# Patient Record
Sex: FEMALE | Race: WHITE | Employment: UNEMPLOYED | URBAN - METROPOLITAN AREA
[De-identification: names, ages, dates, MRNs, and addresses within clinical notes are randomized per-mention and may not be internally consistent; named-entity substitution may affect disease eponyms.]

---

## 2019-07-31 ENCOUNTER — HOSPITAL ENCOUNTER (OUTPATIENT)
Dept: MAMMOGRAPHY | Age: 49
Discharge: HOME | End: 2019-07-31
Attending: INTERNAL MEDICINE
Payer: COMMERCIAL

## 2019-07-31 DIAGNOSIS — Z12.31 ENCOUNTER FOR SCREENING MAMMOGRAM FOR MALIGNANT NEOPLASM OF BREAST: ICD-10-CM

## 2019-07-31 PROCEDURE — 77063 BREAST TOMOSYNTHESIS BI: CPT

## 2019-08-07 ENCOUNTER — APPOINTMENT (RX ONLY)
Dept: URBAN - METROPOLITAN AREA CLINIC 24 | Facility: CLINIC | Age: 49
Setting detail: DERMATOLOGY
End: 2019-08-07

## 2019-08-07 DIAGNOSIS — L57.8 OTHER SKIN CHANGES DUE TO CHRONIC EXPOSURE TO NONIONIZING RADIATION: ICD-10-CM

## 2019-08-07 DIAGNOSIS — D485 NEOPLASM OF UNCERTAIN BEHAVIOR OF SKIN: ICD-10-CM

## 2019-08-07 DIAGNOSIS — Z71.89 OTHER SPECIFIED COUNSELING: ICD-10-CM

## 2019-08-07 DIAGNOSIS — L81.4 OTHER MELANIN HYPERPIGMENTATION: ICD-10-CM

## 2019-08-07 DIAGNOSIS — D22 MELANOCYTIC NEVI: ICD-10-CM

## 2019-08-07 DIAGNOSIS — L82.1 OTHER SEBORRHEIC KERATOSIS: ICD-10-CM

## 2019-08-07 PROBLEM — D48.5 NEOPLASM OF UNCERTAIN BEHAVIOR OF SKIN: Status: ACTIVE | Noted: 2019-08-07

## 2019-08-07 PROBLEM — D22.0 MELANOCYTIC NEVI OF LIP: Status: ACTIVE | Noted: 2019-08-07

## 2019-08-07 PROBLEM — D22.62 MELANOCYTIC NEVI OF LEFT UPPER LIMB, INCLUDING SHOULDER: Status: ACTIVE | Noted: 2019-08-07

## 2019-08-07 PROBLEM — D22.72 MELANOCYTIC NEVI OF LEFT LOWER LIMB, INCLUDING HIP: Status: ACTIVE | Noted: 2019-08-07

## 2019-08-07 PROBLEM — D23.71 OTHER BENIGN NEOPLASM OF SKIN OF RIGHT LOWER LIMB, INCLUDING HIP: Status: ACTIVE | Noted: 2019-08-07

## 2019-08-07 PROCEDURE — ? COUNSELING

## 2019-08-07 PROCEDURE — ? BIOPSY BY SHAVE METHOD

## 2019-08-07 PROCEDURE — 11102 TANGNTL BX SKIN SINGLE LES: CPT

## 2019-08-07 PROCEDURE — 99203 OFFICE O/P NEW LOW 30 MIN: CPT | Mod: 25

## 2019-08-07 ASSESSMENT — LOCATION DETAILED DESCRIPTION DERM
LOCATION DETAILED: RIGHT UPPER CUTANEOUS LIP
LOCATION DETAILED: LEFT POPLITEAL SKIN
LOCATION DETAILED: RIGHT LATERAL DORSAL FOOT
LOCATION DETAILED: RIGHT DISTAL DORSAL FOREARM
LOCATION DETAILED: UPPER STERNUM
LOCATION DETAILED: LEFT PROXIMAL POSTERIOR UPPER ARM
LOCATION DETAILED: LEFT INFERIOR LATERAL FOREHEAD

## 2019-08-07 ASSESSMENT — LOCATION SIMPLE DESCRIPTION DERM
LOCATION SIMPLE: RIGHT FOREARM
LOCATION SIMPLE: LEFT POSTERIOR UPPER ARM
LOCATION SIMPLE: LEFT POPLITEAL SKIN
LOCATION SIMPLE: RIGHT LIP
LOCATION SIMPLE: CHEST
LOCATION SIMPLE: RIGHT FOOT
LOCATION SIMPLE: LEFT FOREHEAD

## 2019-08-07 ASSESSMENT — LOCATION ZONE DERM
LOCATION ZONE: LEG
LOCATION ZONE: LIP
LOCATION ZONE: FACE
LOCATION ZONE: ARM
LOCATION ZONE: TRUNK
LOCATION ZONE: FEET

## 2019-08-07 NOTE — PROCEDURE: BIOPSY BY SHAVE METHOD
Render In Bullet Format When Appropriate: No
Anesthesia Type: 1% lidocaine with epinephrine and a 1:10 solution of 8.4% sodium bicarbonate
Hemostasis: Aluminum Chloride
Electrodesiccation And Curettage Text: The wound bed was treated with electrodesiccation and curettage after the biopsy was performed.
Consent: Written consent was obtained and risks were reviewed including but not limited to scarring, infection, bleeding, scabbing, incomplete removal, nerve damage and allergy to anesthesia.
Curettage Text: The wound bed was treated with curettage after the biopsy was performed.
X Size Of Lesion In Cm: 0
Depth Of Biopsy: dermis
Dressing: bandage
Render Post-Care Instructions In Note?: yes
Silver Nitrate Text: The wound bed was treated with silver nitrate after the biopsy was performed.
Wound Care: Vaseline
Electrodesiccation Text: The wound bed was treated with electrodesiccation after the biopsy was performed.
Billing Type: Third-Party Bill
Biopsy Method: Personna blade
Detail Level: Detailed
Anesthesia Volume In Cc: 0.1
Biopsy Type: H and E
Type Of Destruction Used: Curettage
Cryotherapy Text: The wound bed was treated with cryotherapy after the biopsy was performed.
Post-Care Instructions: I reviewed with the patient in detail post-care instructions. Patient is to keep the biopsy site dry overnight, and then apply vaseline twice daily until healed. Patient may apply hydrogen peroxide soaks to remove any crusting. Patient given a wound care sheet.
Notification Instructions: Patient will be notified of biopsy results. However, patient instructed to call the office if not contacted within 2 weeks.

## 2019-08-15 ENCOUNTER — HOSPITAL ENCOUNTER (OUTPATIENT)
Dept: MAMMOGRAPHY | Age: 49
Discharge: HOME | End: 2019-08-15
Attending: INTERNAL MEDICINE
Payer: COMMERCIAL

## 2019-08-15 DIAGNOSIS — R92.8 ABNORMAL SCREENING MAMMOGRAM: ICD-10-CM

## 2019-08-15 PROCEDURE — 77065 DX MAMMO INCL CAD UNI: CPT

## 2019-08-21 ENCOUNTER — HOSPITAL ENCOUNTER (OUTPATIENT)
Dept: MAMMOGRAPHY | Age: 49
Discharge: HOME | End: 2019-08-21
Attending: INTERNAL MEDICINE
Payer: COMMERCIAL

## 2019-08-21 VITALS
HEART RATE: 66 BPM | OXYGEN SATURATION: 99 % | SYSTOLIC BLOOD PRESSURE: 103 MMHG | TEMPERATURE: 97.1 F | DIASTOLIC BLOOD PRESSURE: 57 MMHG

## 2019-08-21 DIAGNOSIS — R92.8 ABNORMAL MAMMOGRAM OF RIGHT BREAST: ICD-10-CM

## 2019-08-21 PROCEDURE — 74011000250 HC RX REV CODE- 250

## 2019-08-21 PROCEDURE — 74011250636 HC RX REV CODE- 250/636

## 2019-08-21 PROCEDURE — 19081 BX BREAST 1ST LESION STRTCTC: CPT

## 2019-08-21 PROCEDURE — 88305 TISSUE EXAM BY PATHOLOGIST: CPT

## 2019-08-21 PROCEDURE — 88342 IMHCHEM/IMCYTCHM 1ST ANTB: CPT

## 2019-08-21 PROCEDURE — 77065 DX MAMMO INCL CAD UNI: CPT

## 2019-08-21 RX ORDER — LIDOCAINE HYDROCHLORIDE 10 MG/ML
5 INJECTION INFILTRATION; PERINEURAL
Status: COMPLETED | OUTPATIENT
Start: 2019-08-21 | End: 2019-08-21

## 2019-08-21 RX ORDER — LIDOCAINE HYDROCHLORIDE AND EPINEPHRINE 10; 10 MG/ML; UG/ML
5 INJECTION, SOLUTION INFILTRATION; PERINEURAL
Status: COMPLETED | OUTPATIENT
Start: 2019-08-21 | End: 2019-08-21

## 2019-08-21 RX ORDER — LIDOCAINE HYDROCHLORIDE AND EPINEPHRINE 10; 10 MG/ML; UG/ML
10 INJECTION, SOLUTION INFILTRATION; PERINEURAL
Status: COMPLETED | OUTPATIENT
Start: 2019-08-21 | End: 2019-08-21

## 2019-08-21 RX ADMIN — LIDOCAINE HYDROCHLORIDE,EPINEPHRINE BITARTRATE 40 MG: 10; .01 INJECTION, SOLUTION INFILTRATION; PERINEURAL at 12:04

## 2019-08-21 RX ADMIN — LIDOCAINE HYDROCHLORIDE 5 ML: 10 INJECTION, SOLUTION INFILTRATION; PERINEURAL at 12:04

## 2019-08-21 RX ADMIN — SODIUM CHLORIDE 250 ML: 900 INJECTION, SOLUTION INTRAVENOUS at 12:01

## 2019-08-21 RX ADMIN — LIDOCAINE HYDROCHLORIDE,EPINEPHRINE BITARTRATE 100 MG: 10; .01 INJECTION, SOLUTION INFILTRATION; PERINEURAL at 12:03

## 2019-08-22 ENCOUNTER — APPOINTMENT (RX ONLY)
Dept: URBAN - METROPOLITAN AREA CLINIC 24 | Facility: CLINIC | Age: 49
Setting detail: DERMATOLOGY
End: 2019-08-22

## 2019-08-22 PROBLEM — D04.71 CARCINOMA IN SITU OF SKIN OF RIGHT LOWER LIMB, INCLUDING HIP: Status: ACTIVE | Noted: 2019-08-22

## 2019-08-22 PROCEDURE — 17272 DSTR MAL LES S/N/H/F/G 1.1-2: CPT

## 2019-08-22 PROCEDURE — ? CURETTAGE AND DESTRUCTION

## 2019-08-22 NOTE — PROCEDURE: CURETTAGE AND DESTRUCTION
Additional Information: (Optional): The wound was cleaned, and a pressure dressing was applied.  The patient received detailed post-op instructions.
Size Of Lesion After Curettage: 1.3
Bill For Surgical Tray: no
Total Volume (Ccs): 1
Render Post-Care Instructions In Note?: yes
Number Of Curettages: 3
Detail Level: Detailed
Post-Care Instructions: I reviewed with the patient in detail post-care instructions. Patient is to keep the area dry for 48 hours, and not to engage in any swimming until the area is healed. Should the patient develop any fevers, chills, bleeding, severe pain patient will contact the office immediately.
Anesthesia Type: 1% lidocaine with epinephrine and a 1:10 solution of 8.4% sodium bicarbonate
Anesthesia Volume In Cc: 2
Consent was obtained from the patient. The risks, benefits and alternatives to therapy were discussed in detail. Specifically, the risks of infection, scarring, bleeding, prolonged wound healing, nerve injury, incomplete removal, allergy to anesthesia and recurrence were addressed. Alternatives to ED&C, such as: surgical removal and cream were also discussed.  Prior to the procedure, the treatment site was clearly identified and confirmed by the patient. All components of Universal Protocol/PAUSE Rule completed.
Size Of Lesion In Cm: 1.2
Bill As A Line Item Or As Units: Line Item
What Was Performed First?: Curettage
Cautery Type: electrodesiccation

## 2019-08-22 NOTE — PROGRESS NOTES
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 Dr. Radha Beltrán and I discussed the results as well as the next steps, the patient is going to have a bilateral breast MRI on 8-26-19 and then follow up with Flavia Mane NP about which surgeon she wants to see. The patient stated that she is friends with a surgeon at [facility] and would like to speak with him about his recommendations. The patient did receive a new breast cancer patient packet of information that includes the contact information for the navigators in case she has any further questions.

## 2019-08-26 ENCOUNTER — HOSPITAL ENCOUNTER (OUTPATIENT)
Dept: MRI IMAGING | Age: 49
Discharge: HOME | End: 2019-08-26
Payer: COMMERCIAL

## 2019-08-26 DIAGNOSIS — R92.8 ABNORMAL FINDING ON MAMMOGRAPHY: ICD-10-CM

## 2019-08-26 PROCEDURE — 74011000258 HC RX REV CODE- 258

## 2019-08-26 PROCEDURE — 74011250636 HC RX REV CODE- 250/636

## 2019-08-26 PROCEDURE — 77049 MRI BREAST C-+ W/CAD BI: CPT

## 2019-08-26 PROCEDURE — A9575 INJ GADOTERATE MEGLUMI 0.1ML: HCPCS

## 2019-08-26 RX ORDER — SODIUM CHLORIDE 0.9 % (FLUSH) 0.9 %
10 SYRINGE (ML) INJECTION
Status: COMPLETED | OUTPATIENT
Start: 2019-08-26 | End: 2019-08-26

## 2019-08-26 RX ORDER — GADOTERATE MEGLUMINE 376.9 MG/ML
15 INJECTION INTRAVENOUS
Status: COMPLETED | OUTPATIENT
Start: 2019-08-26 | End: 2019-08-26

## 2019-08-26 RX ADMIN — GADOTERATE MEGLUMINE 15 ML: 376.9 INJECTION INTRAVENOUS at 11:27

## 2019-08-26 RX ADMIN — Medication 10 ML: at 11:27

## 2019-08-26 RX ADMIN — SODIUM CHLORIDE 100 ML: 900 INJECTION, SOLUTION INTRAVENOUS at 11:27

## 2019-09-09 NOTE — H&P (VIEW-ONLY)
[facility] 
[address], [address] [place] Phone [phone]   Fax [phone] Date of visit: 2019 Primary/Requesting provider: Abdulaziz Hugo NP Chief Complaint Patient presents with  New Patient  
  right breast DCIS Name: Paula Riggins MRN: [id number] : 1970 Age: 51 y.o. Sex: female PCP: Abdulaziz Hugo NP  
 
 
CC:   
Chief Complaint Patient presents with  New Patient  
  right breast DCIS  
 
 
HPI: 
 
 Paula Riggins is a 51 y.o. female who presents for evaluation of right breast DCIS. This is a 50-year-old female recently diagnosed with biopsy-proven right breast DCIS ER CO positive. Patient has no previous history of breast cancer. She has no family history of breast cancer. The suspicious abnormality was found on screening mammogram.  She has had MRI which reveals extension approximately 4 cm posterior to the initial lesion. She is here today to discuss right breast DCIS and surgical recommendations. [place] [facility] BREAST MRI BEFORE AND AFTER CONTRAST 
  
CLINICAL HISTORY:  Follow-up recent needle biopsy diagnosis of right breast 
low-grade DCIS; for preoperative evaluation. 
  
TECHNIQUE: Standard axial and sagittal images were obtained before and during 
bolus injection of 15 cc of Dotarem IV. CAD was employed. 
  
COMPARISON:  Multiple prior studies including 3-D screening mammogram of 2013, right additional views of August 15, 2019, and right stereotactic 
biopsy of 2019. 
  
FINDINGS: There is extensive background parenchymal enhancement bilaterally. There is reticulonodular enhancement extending linearly in a radial 
configuration posteriorly and slightly superiorly from the right central 9:00 
biopsy marker clip which is suspicious for extension of the DCIS. Review of 
recent 3-D screening mammogram and right additional views with magnification suggests possible associated microcalcifications. Note dominant suspicious 
enhancement is seen in the left breast.  No pathologically enlarged or 
dysmorphic lymph nodes are seen. The liver, lungs, and chest wall appear 
unremarkable as imaged. 
  
IMPRESSION IMPRESSION:   
  
1.  RETICULONODULAR ENHANCEMENT EXTENDING APPROXIMATELY 4 CM POSTERIORLY FROM 
THE RIGHT CENTRAL 9:00 BIOPSY MARKER CLIP IS SUSPICIOUS FOR EXTENSION OF DCIS. THERE ARE POSSIBLE ASSOCIATED MICROCALCIFICATIONS, AND THEREFORE  
CRANIOCAUDAL, STRAIGHT LATERAL, AND MAGNIFICATION SPOT COMPRESSION VIEWS ARE 
RECOMMENDED EITHER BEFORE OR AT THE TIME OF ANTICIPATED PREOPERATIVE MAMMOGRAPHIC WIRE LOCALIZATION. 
  
2. EXTENSIVE BILATERAL BREAST PARENCHYMAL ENHANCEMENT LIMITS MRI EVALUATION FOR 
POSSIBLE SMALL FOCI OF TUMOR, AND THEREFORE FOLLOW-UP MRI IS RECOMMENDED IN ONE 
YEAR. 
  
  
Right: BI-RADS Assessment Category 6: Known Biopsy Proven Malignancy. Bilateral parenchyma: BI-RADS Assessment Category 3: Probably benign- Short-interval follow-up suggested (1 year). PMH: 
 
Past Medical History:  
Diagnosis Date  Cancer ([address] right breast DCIS  
 
 
PSH: 
 
Past Surgical History:  
Procedure Laterality Date  HX HEENT  2009  
 lasix  HX ORTHOPAEDIC Right 2007  
 knee MEDS: 
 
Current Outpatient Medications Medication Sig  
 PRENATAL VITAMIN PO take  by mouth. No current facility-administered medications for this visit. ALLERGIES:   
 
No Known Allergies SH: Social History Tobacco Use  Smoking status: Never Smoker  Smokeless tobacco: Never Used Substance Use Topics  Alcohol use: Yes Comment: ocass  Drug use: Never FH: 
 
Family History Problem Relation Age of Onset  Cancer Maternal Grandfather   
     colon  Breast Cancer Neg Hx Review of Systems Constitutional: Negative. HENT: Negative. Eyes: Negative. Wears contacts. Respiratory: Negative. Cardiovascular: Negative. Gastrointestinal: Negative. Genitourinary: Negative. Musculoskeletal: Negative. Skin: Negative. Neurological: Negative. Endo/Heme/Allergies: Bruises/bleeds easily. Psychiatric/Behavioral: Negative. Physical Exam:  
 
Visit Vitals BP (!) 136/92 Pulse 61 Ht 5' 5\" (1.651 m) Wt 129 lb (58.5 kg) BMI 21.47 kg/m² Physical Exam  
Constitutional: She is oriented to person, place, and time and well-developed, well-nourished, and in no distress. HENT:  
Head: Normocephalic and atraumatic. Mouth/Throat: Oropharynx is clear and moist.  
Eyes: Pupils are equal, round, and reactive to light. EOM are normal.  
Neck: Normal range of motion. Neck supple. No tracheal deviation present. No thyromegaly present. Cardiovascular: Normal rate, regular rhythm and normal heart sounds. No murmur heard. Pulmonary/Chest: Effort normal and breath sounds normal. No respiratory distress. She has no wheezes. She has no rales. Abdominal: Soft. Bowel sounds are normal. She exhibits no distension and no mass. There is no tenderness. There is no rebound and no guarding. Musculoskeletal: Normal range of motion. She exhibits no edema. Neurological: She is alert and oriented to person, place, and time. Skin: Skin is warm and dry. No erythema. Psychiatric: Mood and judgment normal.  
 
 
Assessment/Plan:  Davide Hanson is a 51 y.o. female who has signs and symptoms consistent with right breast DCIS 4 cm. Today I recommended needle localization partial mastectomy for margins. We had a long discussion about breast cancer history of breast cancer operations hormone receptor education and made recommendations. All their questions were answered to the best my ability she will be scheduled at the next available date and time. ICD-10-CM ICD-9-CM 1. Malignant neoplasm of central portion of right breast in female, estrogen receptor positive (HCC) C50.111 174.1   
 Z17.0 V86.0 I went through the risks of bleeding, infection and anesthesia. Counseling time:counseling time more than 50% of visit: 1 hour was utilized in office visit a 50% of times in a face-to-face discussion explaining breast cancer, and the anatomy of the breast, various types of surgeries and management options, importance of radiation and hormone receptor status.  
 
Signed: Nayeli Salazar DO  
9/9/2019  9:15 AM

## 2019-10-07 ENCOUNTER — HOSPITAL ENCOUNTER (OUTPATIENT)
Dept: SURGERY | Age: 49
Discharge: HOME | End: 2019-10-07

## 2019-10-07 VITALS — BODY MASS INDEX: 21.66 KG/M2 | WEIGHT: 130 LBS | HEIGHT: 65 IN

## 2019-10-07 NOTE — PERIOP NOTES
Patient verified name and . Order for consent NOT found in EHR, unable to confirm case posting; patient verifies procedure. Type 1B surgery, PAT phone assessment complete. Orders NOT received. Labs per surgeon: No orders received at this time. Labs per anesthesia protocol: None. Patient answered medical/surgical history questions at their best of ability. All prior to admission medications documented in [facility] Care. Patient instructed to take the following medications the day of surgery according to anesthesia guidelines with a small sip of water: None. Hold all vitamins/supplements/herbals 7 days prior to surgery and NSAIDS 5 days prior to surgery. Patient instructed on the following:  Arrive at [address] [facility], time of arrival to be called the day before by 1700  NPO after midnight including gum, mints, and ice chips  Responsible adult must drive patient to the hospital, stay during surgery, and patient will need supervision 24 hours after anesthesia  Use anti-bacterial soap in shower the night before surgery and on the morning of surgery  All piercings must be removed prior to arrival.    Leave all valuables (money and jewelry) at home but bring insurance card and ID on       DOS. Do not wear make-up, nail polish, lotions, cologne, perfumes, powders, or oil on skin. Patient teach back successful and patient demonstrates knowledge of instruction.

## 2019-10-09 ENCOUNTER — ANESTHESIA (OUTPATIENT)
Dept: SURGERY | Age: 49
End: 2019-10-09
Payer: COMMERCIAL

## 2019-10-09 ENCOUNTER — APPOINTMENT (OUTPATIENT)
Dept: MAMMOGRAPHY | Age: 49
End: 2019-10-09
Attending: SURGERY
Payer: COMMERCIAL

## 2019-10-09 ENCOUNTER — HOSPITAL ENCOUNTER (OUTPATIENT)
Age: 49
Setting detail: OUTPATIENT SURGERY
Discharge: HOME | End: 2019-10-09
Attending: SURGERY | Admitting: SURGERY
Payer: COMMERCIAL

## 2019-10-09 ENCOUNTER — ANESTHESIA EVENT (OUTPATIENT)
Dept: SURGERY | Age: 49
End: 2019-10-09
Payer: COMMERCIAL

## 2019-10-09 VITALS
RESPIRATION RATE: 16 BRPM | BODY MASS INDEX: 21.33 KG/M2 | HEART RATE: 65 BPM | SYSTOLIC BLOOD PRESSURE: 114 MMHG | OXYGEN SATURATION: 99 % | TEMPERATURE: 97.6 F | DIASTOLIC BLOOD PRESSURE: 63 MMHG | WEIGHT: 128 LBS | HEIGHT: 65 IN

## 2019-10-09 DIAGNOSIS — D05.11 DUCTAL CARCINOMA IN SITU (DCIS) OF RIGHT BREAST: ICD-10-CM

## 2019-10-09 LAB — HCG UR QL: NEGATIVE

## 2019-10-09 PROCEDURE — 76210000020 HC REC RM PH II FIRST 0.5 HR: Performed by: SURGERY

## 2019-10-09 PROCEDURE — 77030031139 HC SUT VCRL2 J&J -A: Performed by: SURGERY

## 2019-10-09 PROCEDURE — 74011250636 HC RX REV CODE- 250/636: Performed by: ANESTHESIOLOGY

## 2019-10-09 PROCEDURE — 74011000250 HC RX REV CODE- 250: Performed by: SURGERY

## 2019-10-09 PROCEDURE — 76210000006 HC OR PH I REC 0.5 TO 1 HR: Performed by: SURGERY

## 2019-10-09 PROCEDURE — 77030002996 HC SUT SLK J&J -A: Performed by: SURGERY

## 2019-10-09 PROCEDURE — 77030002916 HC SUT ETHLN J&J -A: Performed by: SURGERY

## 2019-10-09 PROCEDURE — 76010000161 HC OR TIME 1 TO 1.5 HR INTENSV-TIER 1: Performed by: SURGERY

## 2019-10-09 PROCEDURE — 77030016570 HC BLNKT BAIR HGGR 3M -B: Performed by: ANESTHESIOLOGY

## 2019-10-09 PROCEDURE — 77030018836 HC SOL IRR NACL ICUM -A: Performed by: SURGERY

## 2019-10-09 PROCEDURE — 77030002933 HC SUT MCRYL J&J -A: Performed by: SURGERY

## 2019-10-09 PROCEDURE — 76060000033 HC ANESTHESIA 1 TO 1.5 HR: Performed by: SURGERY

## 2019-10-09 PROCEDURE — 74011000250 HC RX REV CODE- 250

## 2019-10-09 PROCEDURE — 81025 URINE PREGNANCY TEST: CPT

## 2019-10-09 PROCEDURE — 77030040361 HC SLV COMPR DVT MDII -B: Performed by: SURGERY

## 2019-10-09 PROCEDURE — 77030010509 HC AIRWY LMA MSK TELE -A: Performed by: ANESTHESIOLOGY

## 2019-10-09 PROCEDURE — 74011250636 HC RX REV CODE- 250/636: Performed by: SURGERY

## 2019-10-09 PROCEDURE — 74011250636 HC RX REV CODE- 250/636

## 2019-10-09 PROCEDURE — 88307 TISSUE EXAM BY PATHOLOGIST: CPT

## 2019-10-09 PROCEDURE — 77030010512 HC APPL CLP LIG J&J -C: Performed by: SURGERY

## 2019-10-09 PROCEDURE — 77030003460 MAM PLC NDL/WIRE/CLIP/SEED BREAST RT

## 2019-10-09 RX ORDER — HYDROMORPHONE HYDROCHLORIDE 2 MG/ML
0.5 INJECTION, SOLUTION INTRAMUSCULAR; INTRAVENOUS; SUBCUTANEOUS
Status: DISCONTINUED | OUTPATIENT
Start: 2019-10-09 | End: 2019-10-09 | Stop reason: HOSPADM

## 2019-10-09 RX ORDER — SODIUM CHLORIDE 0.9 % (FLUSH) 0.9 %
5-40 SYRINGE (ML) INJECTION EVERY 8 HOURS
Status: DISCONTINUED | OUTPATIENT
Start: 2019-10-09 | End: 2019-10-09 | Stop reason: HOSPADM

## 2019-10-09 RX ORDER — OXYCODONE AND ACETAMINOPHEN 10; 325 MG/1; MG/1
1 TABLET ORAL AS NEEDED
Status: DISCONTINUED | OUTPATIENT
Start: 2019-10-09 | End: 2019-10-09 | Stop reason: HOSPADM

## 2019-10-09 RX ORDER — LIDOCAINE HYDROCHLORIDE 10 MG/ML
7 INJECTION INFILTRATION; PERINEURAL
Status: COMPLETED | OUTPATIENT
Start: 2019-10-09 | End: 2019-10-09

## 2019-10-09 RX ORDER — OXYCODONE AND ACETAMINOPHEN 5; 325 MG/1; MG/1
1 TABLET ORAL
Qty: 30 TAB | Refills: 0 | Status: SHIPPED | OUTPATIENT
Start: 2019-10-09 | End: 2019-10-14

## 2019-10-09 RX ORDER — DEXAMETHASONE SODIUM PHOSPHATE 4 MG/ML
INJECTION, SOLUTION INTRA-ARTICULAR; INTRALESIONAL; INTRAMUSCULAR; INTRAVENOUS; SOFT TISSUE AS NEEDED
Status: DISCONTINUED | OUTPATIENT
Start: 2019-10-09 | End: 2019-10-09 | Stop reason: HOSPADM

## 2019-10-09 RX ORDER — SUCCINYLCHOLINE CHLORIDE 20 MG/ML
INJECTION INTRAMUSCULAR; INTRAVENOUS AS NEEDED
Status: DISCONTINUED | OUTPATIENT
Start: 2019-10-09 | End: 2019-10-09 | Stop reason: HOSPADM

## 2019-10-09 RX ORDER — LIDOCAINE HYDROCHLORIDE 20 MG/ML
INJECTION, SOLUTION EPIDURAL; INFILTRATION; INTRACAUDAL; PERINEURAL AS NEEDED
Status: DISCONTINUED | OUTPATIENT
Start: 2019-10-09 | End: 2019-10-09 | Stop reason: HOSPADM

## 2019-10-09 RX ORDER — FENTANYL CITRATE 50 UG/ML
INJECTION, SOLUTION INTRAMUSCULAR; INTRAVENOUS AS NEEDED
Status: DISCONTINUED | OUTPATIENT
Start: 2019-10-09 | End: 2019-10-09 | Stop reason: HOSPADM

## 2019-10-09 RX ORDER — BUPIVACAINE HYDROCHLORIDE 2.5 MG/ML
INJECTION, SOLUTION EPIDURAL; INFILTRATION; INTRACAUDAL AS NEEDED
Status: DISCONTINUED | OUTPATIENT
Start: 2019-10-09 | End: 2019-10-09 | Stop reason: HOSPADM

## 2019-10-09 RX ORDER — EPHEDRINE SULFATE 50 MG/ML
INJECTION, SOLUTION INTRAVENOUS AS NEEDED
Status: DISCONTINUED | OUTPATIENT
Start: 2019-10-09 | End: 2019-10-09 | Stop reason: HOSPADM

## 2019-10-09 RX ORDER — CEFAZOLIN SODIUM/WATER 2 G/20 ML
2 SYRINGE (ML) INTRAVENOUS ONCE
Status: COMPLETED | OUTPATIENT
Start: 2019-10-09 | End: 2019-10-09

## 2019-10-09 RX ORDER — SODIUM CHLORIDE 0.9 % (FLUSH) 0.9 %
5-40 SYRINGE (ML) INJECTION AS NEEDED
Status: DISCONTINUED | OUTPATIENT
Start: 2019-10-09 | End: 2019-10-09 | Stop reason: HOSPADM

## 2019-10-09 RX ORDER — ONDANSETRON 2 MG/ML
INJECTION INTRAMUSCULAR; INTRAVENOUS AS NEEDED
Status: DISCONTINUED | OUTPATIENT
Start: 2019-10-09 | End: 2019-10-09 | Stop reason: HOSPADM

## 2019-10-09 RX ORDER — PROPOFOL 10 MG/ML
INJECTION, EMULSION INTRAVENOUS AS NEEDED
Status: DISCONTINUED | OUTPATIENT
Start: 2019-10-09 | End: 2019-10-09 | Stop reason: HOSPADM

## 2019-10-09 RX ORDER — SODIUM CHLORIDE, SODIUM LACTATE, POTASSIUM CHLORIDE, CALCIUM CHLORIDE 600; 310; 30; 20 MG/100ML; MG/100ML; MG/100ML; MG/100ML
75 INJECTION, SOLUTION INTRAVENOUS CONTINUOUS
Status: DISCONTINUED | OUTPATIENT
Start: 2019-10-09 | End: 2019-10-09 | Stop reason: HOSPADM

## 2019-10-09 RX ORDER — MIDAZOLAM HYDROCHLORIDE 1 MG/ML
2 INJECTION, SOLUTION INTRAMUSCULAR; INTRAVENOUS
Status: COMPLETED | OUTPATIENT
Start: 2019-10-09 | End: 2019-10-09

## 2019-10-09 RX ORDER — OXYCODONE HYDROCHLORIDE 5 MG/1
5 TABLET ORAL
Status: DISCONTINUED | OUTPATIENT
Start: 2019-10-09 | End: 2019-10-09 | Stop reason: HOSPADM

## 2019-10-09 RX ADMIN — PROPOFOL 150 MG: 10 INJECTION, EMULSION INTRAVENOUS at 10:45

## 2019-10-09 RX ADMIN — DEXAMETHASONE SODIUM PHOSPHATE 10 MG: 4 INJECTION, SOLUTION INTRA-ARTICULAR; INTRALESIONAL; INTRAMUSCULAR; INTRAVENOUS; SOFT TISSUE at 10:54

## 2019-10-09 RX ADMIN — SODIUM CHLORIDE, SODIUM LACTATE, POTASSIUM CHLORIDE, AND CALCIUM CHLORIDE 75 ML/HR: 600; 310; 30; 20 INJECTION, SOLUTION INTRAVENOUS at 08:03

## 2019-10-09 RX ADMIN — MIDAZOLAM 2 MG: 1 INJECTION INTRAMUSCULAR; INTRAVENOUS at 10:33

## 2019-10-09 RX ADMIN — EPHEDRINE SULFATE 10 MG: 50 INJECTION, SOLUTION INTRAVENOUS at 11:23

## 2019-10-09 RX ADMIN — Medication 2 G: at 11:05

## 2019-10-09 RX ADMIN — EPHEDRINE SULFATE 10 MG: 50 INJECTION, SOLUTION INTRAVENOUS at 11:13

## 2019-10-09 RX ADMIN — ONDANSETRON 4 MG: 2 INJECTION INTRAMUSCULAR; INTRAVENOUS at 11:15

## 2019-10-09 RX ADMIN — FENTANYL CITRATE 12.5 MCG: 50 INJECTION, SOLUTION INTRAMUSCULAR; INTRAVENOUS at 11:25

## 2019-10-09 RX ADMIN — PROPOFOL 50 MG: 10 INJECTION, EMULSION INTRAVENOUS at 10:47

## 2019-10-09 RX ADMIN — EPHEDRINE SULFATE 10 MG: 50 INJECTION, SOLUTION INTRAVENOUS at 11:33

## 2019-10-09 RX ADMIN — LIDOCAINE HYDROCHLORIDE 7 ML: 10 INJECTION, SOLUTION INFILTRATION; PERINEURAL at 09:35

## 2019-10-09 RX ADMIN — HYDROMORPHONE HYDROCHLORIDE 0.5 MG: 2 INJECTION, SOLUTION INTRAMUSCULAR; INTRAVENOUS; SUBCUTANEOUS at 12:14

## 2019-10-09 RX ADMIN — SUCCINYLCHOLINE CHLORIDE 20 MG: 20 INJECTION INTRAMUSCULAR; INTRAVENOUS at 10:59

## 2019-10-09 RX ADMIN — LIDOCAINE HYDROCHLORIDE 100 MG: 20 INJECTION, SOLUTION EPIDURAL; INFILTRATION; INTRACAUDAL; PERINEURAL at 10:45

## 2019-10-09 RX ADMIN — HYDROMORPHONE HYDROCHLORIDE 0.5 MG: 2 INJECTION, SOLUTION INTRAMUSCULAR; INTRAVENOUS; SUBCUTANEOUS at 12:06

## 2019-10-09 NOTE — DISCHARGE INSTRUCTIONS
Patient Education        Lumpectomy: What to Expect at Home  Your Recovery    For 1 or 2 days after the surgery you will probably feel tired and have some pain. The skin around the cut (incision) may feel firm, swollen, and tender, and be bruised. Tenderness should go away in about 2 or 3 days, and the bruising within 2 weeks. Firmness and swelling may last for 3 to 6 months. You may feel a soft lump in your breast that gradually turns hard. This is the incision healing. It is not cancer. Women should wear a well-fitted and supportive bra, even during the night, for 1 week. You will probably be able to go back to work or your normal routine in 1 to 3 weeks after the surgery. This may depend on whether you have more treatment. Your doctor may have removed some lymph nodes in your armpit to see if the cancer has spread. If so, you may feel either numbness or tingling (\"pins and needles\") in your armpit or on the inside of your upper arm. This should improve over the next several weeks. Some people have numbness for a longer time. When you find out that you have cancer, you may feel many emotions and may need some help coping. Seek out family, friends, and counselors for support. You also can do things at home to make yourself feel better while you go through treatment. Call the [facility] (8-259.728.3457) or visit its website at [address] [email or web address] Vetr for more information. This care sheet gives you a general idea about how long it will take for you to recover. But each person recovers at a different pace. Follow the steps below to get better as quickly as possible. How can you care for yourself at home? Activity    · Rest when you feel tired. Getting enough sleep will help you recover. You may want to sleep on the side that has not been operated on.  A woman may want to use a pillow to support the affected breast while lying on her side.     · Avoid strenuous activities, such as biking, jogging, weightlifting, or aerobic exercise, for 1 month or until your doctor says it is okay. This may include housework, such as washing windows, especially if you have to use the arm next to the affected breast.     · Most people can return to their normal activities within 2 weeks.     · Try to walk each day. Start out by walking a little more than you did the day before. Bit by bit, increase the amount you walk. Walking boosts blood flow and helps prevent pneumonia and constipation.     · For 1 to 2 weeks, avoid lifting anything over 10 to 15 pounds or that would make you strain. This may include heavy grocery bags and milk containers, a heavy briefcase or backpack, cat litter or dog food bags, a vacuum , or a child.     · You may drive when you are no longer taking pain medicine and can use your arm without pain. Talk to your doctor about when to start driving, especially if you are having radiation treatments.     · You will probably be able to go back to work or your normal routine in 1 to 3 weeks. It may be longer, depending on the type of work you do and whether you are having radiation or chemotherapy.     · You may shower 24 to 48 hours after surgery, if your doctor okays it. Pat the incision dry. Do not take a bath for the first 2 weeks, or until your doctor tells you it is okay. Diet    · You can eat your normal diet. If your stomach is upset, try bland, low-fat foods like plain rice, broiled chicken, toast, and yogurt.     · You may notice that your bowel movements are not regular right after your surgery. This is common. Try to avoid constipation and straining with bowel movements. You may want to take a fiber supplement every day. If you have not had a bowel movement after a couple of days, ask your doctor about taking a mild laxative. Medicines    · Your doctor will tell you if and when you can restart your medicines.  He or she will also give you instructions about taking any new medicines.     · If you take blood thinners, such as warfarin (Coumadin), clopidogrel (Plavix), or aspirin, be sure to talk to your doctor. He or she will tell you if and when to start taking those medicines again. Make sure that you understand exactly what your doctor wants you to do.     · Take pain medicines exactly as directed. ? Your doctor may have given you a medicine to numb the area inside and around your cut (incision). The numbness will last from 6 to 12 hours. If you went home right after the surgery, you may want to take pain medicine before this wears off.  ? If the doctor gave you a prescription medicine for pain, take it as prescribed. ? If you are not taking a prescription pain medicine, ask your doctor if you can take an over-the-counter medicine.     · If your doctor prescribed antibiotics, take them as directed. Do not stop taking them just because you feel better. You need to take the full course of antibiotics.     · If you think your pain medicine is making you sick to your stomach:  ? Take your medicine after meals (unless your doctor has told you not to). ? Ask your doctor for a different pain medicine. Incision care    · If you have strips of tape on the cut the doctor made (incision), leave the tape on for a week or until it falls off.     · When you can shower, wash the area daily with warm, soapy water and pat it dry. Follow-up care is a key part of your treatment and safety. Be sure to make and go to all appointments, and call your doctor if you are having problems. It's also a good idea to know your test results and keep a list of the medicines you take. When should you call for help? Call 911 anytime you think you may need emergency care.  For example, call if:    · You passed out (lost consciousness).     · You have chest pain, are short of breath, or cough up blood.    Call your doctor now or seek immediate medical care if:    · You are sick to your stomach or cannot drink fluids.     · You cannot pass stools or gas.     · You have pain that does not get better after you take your pain medicine.     · You have loose stitches, or your incision comes open.     · Bright red blood has soaked through the bandage over your incision.     · You have signs of a blood clot in your leg (called a deep vein thrombosis), such as:  ? Pain in your calf, back of the knee, thigh, or groin. ? Redness or swelling in your leg.     · You have signs of infection, such as:  ? Increased pain, swelling, warmth, or redness. ? Red streaks leading from the incision. ? Pus draining from the incision. ? A fever.    Watch closely for changes in your health, and be sure to contact your doctor if:    · You have any problems.     · You have new or worse swelling or pain in your arm. Where can you learn more? Go to http://hope-keith.info/. Enter D222 in the search box to learn more about \"Lumpectomy: What to Expect at Home. \"  Current as of: December 19, 2018  Content Version: 12.2  © 3466-6083 [employer]. Care instructions adapted under license by [employer] (which disclaims liability or warranty for this information). If you have questions about a medical condition or this instruction, always ask your healthcare professional. [address] any warranty or liability for your use of this information.

## 2019-10-09 NOTE — BRIEF OP NOTE
BRIEF OPERATIVE NOTE    Date of Procedure: 10/9/2019   Preoperative Diagnosis: Malignant neoplasm of central portion of right female breast, unspecified estrogen receptor status ([facility] [address] [C50.111]  Estrogen receptor positive [Z17.0]  Postoperative Diagnosis: Malignant neoplasm of central portion of right female breast, unspecified estrogen receptor status ([facility] [address] [C50.111]  Estrogen receptor positive [Z17.0]    Procedure(s):  RIGHT PARTIAL BREAST MASTECTOMY FOR MARGINS  RIGHT BREAST NEEDLE LOCALIZED BIOPSY  PREOP 7:30/ LOC 8:30/ SURGERY 10:30  Surgeon(s) and Role:     * Fernie Salazar,  - Primary         Surgical Assistant: None    Surgical Staff:  Circ-1: Danny Ayoub RN  Scrub Tech-1: Bethany Ferrell  Scrub Tech-2: Sunita COLLADO  Event Time In Time Out   Incision Start 1109    Incision Close 1148      Anesthesia: General   Estimated Blood Loss: Minimal  Specimens:   ID Type Source Tests Collected by Time Destination   1 : Right breast partial mastectomy Needle Loc Breast Mass,Right  Jd Alvarez,  10/9/2019 1130 Pathology      Findings: Right breast needle loc partial mastectomy for margins successful with biopsy clip and lesion within specimen on post excision radiograph.    Complications: None  Implants: * No implants in log [facility], [address] [place] [address],[address] Floor

## 2019-10-09 NOTE — ANESTHESIA PREPROCEDURE EVALUATION
Relevant Problems   No relevant active problems       Anesthetic History   No history of anesthetic complications            Review of Systems / Medical History  Patient summary reviewed and pertinent labs reviewed    Pulmonary  Within defined limits                 Neuro/Psych   Within defined limits           Cardiovascular                  Exercise tolerance: >4 METS     GI/Hepatic/Renal  Within defined limits              Endo/Other  Within defined limits           Other Findings              Physical Exam    Airway  Mallampati: II  TM Distance: > 6 cm  Neck ROM: normal range of motion   Mouth opening: Normal     Cardiovascular    Rhythm: regular           Dental    Dentition: Caps/crowns     Pulmonary                 Abdominal  GI exam deferred       Other Findings            Anesthetic Plan    ASA: 2  Anesthesia type: general          Induction: Intravenous  Anesthetic plan and risks discussed with: Patient      Ivan intraop

## 2019-10-09 NOTE — INTERVAL H&P NOTE
H&P Update: 
Davide Hanson was seen and examined. History and physical has been reviewed. The patient has been examined.  There have been no significant clinical changes since the completion of the originally dated History and Physical.

## 2019-10-09 NOTE — OP NOTES
[place]  OPERATIVE REPORT    Name:  Debora Garcia  MR#:  [id number]  :  1970  ACCOUNT #:  [de-identified]  DATE OF SERVICE:  10/09/2019    PREOPERATIVE DIAGNOSIS:  Right breast ductal carcinoma in situ. POSTOPERATIVE DIAGNOSIS:  Right breast ductal carcinoma in situ. PROCEDURE PERFORMED:  Needle localization and partial mastectomy for margins. SURGEON:  Joaquina Myers DO    ASSISTANT:  None. ANESTHESIA:  General endotracheal.    COMPLICATIONS:  None. SPECIMENS REMOVED:  Right breast partial mastectomy for margins marked with a long lateral, short superior, double deep suture also with three clips laterally, two clips superior and one for the deep margin. IMPLANTS:  None. ESTIMATED BLOOD LOSS:  Minimal.    DISPOSITION:  Stable. COUNTS:  Sponge count, needle count, and instrument count all correct x3. DESCRIPTION OF PROCEDURE:  This is a 54-year-old female with right breast DCIS. She is prepared for a right breast needle localization and partial mastectomy for margins. Consent was obtained by describing the procedure to the patient including the potential complications to include infection, bleeding, possible architectural change to the breast, possible positive margins. Consent was obtained and placed in the final chart. She was administered Ancef 2 g IV preoperatively, taken to the operative suite and placed in a supine position. General anesthesia was initiated without complications. She was then prepped and draped in usual sterile fashion. A time-out was taken to confirm the patient name and proper procedure. Following this, the radiographs were reviewed preoperatively with a radiologist.  We then planned for a lateral incision encompassing the two needles. A 0.25% Marcaine with epinephrine was used to anesthetize the skin and subcutaneous tissue and a #15 scalpel blade was then used to make a narrow elliptical incision encompassing the two needles.   Using traction and stabilization with Allis clamps, we circumferentially excised the right breast mass and marked with a long lateral, short superior, double deep suture, also three clips laterally, two superior and one for the deep margin. Post-excision radiographs revealed biopsy clip within the specimen as well as the significant markings from the mammogram.  At this point, we copiously irrigated with saline until clear. We ensured hemostasis using spot cauterization. We reapproximated the subcutaneous tissue with 3-0 Vicryl in an interrupted fashion and 3-0 Vicryl in a simple running fashion. Mastisol and Steri-Strips were placed on top of the incision. Sterile dressing was applied. The patient will be extubated and transferred to Recovery stable. FINDINGS:  A 59-year-old female with right breast DCIS. Right breast needle localization and partial mastectomy of margins was successful with biopsy clip and significant radiographic lesions within the post-excision radiograph. She tolerated the procedure well.       [address], DO      [id number]  D:  10/09/2019 11:56  T:  10/09/2019 15:34  JOB #:  [id number]

## 2019-10-24 NOTE — ANESTHESIA POSTPROCEDURE EVALUATION
Procedure(s):  RIGHT PARTIAL BREAST MASTECTOMY  RIGHT BREAST NEEDLE LOCALIZED BIOPSY  PREOP 7:30/ LOC 8:30/ SURGERY 10:30.    general    Anesthesia Post Evaluation      Multimodal analgesia: multimodal analgesia used between 6 hours prior to anesthesia start to PACU discharge  Patient location during evaluation: PACU  Patient participation: complete - patient participated  Level of consciousness: awake  Pain management: adequate  Airway patency: patent  Anesthetic complications: no  Cardiovascular status: acceptable  Respiratory status: acceptable  Hydration status: acceptable  Post anesthesia nausea and vomiting:  none      Vitals Value Taken Time   /63 10/9/2019 12:53 PM   Temp 36.4 °C (97.6 °F) 10/9/2019 11:58 AM   Pulse 65 10/9/2019 12:53 PM   Resp 16 10/9/2019 12:53 PM   SpO2 99 % 10/9/2019 12:53 PM

## 2019-10-25 ENCOUNTER — HOSPITAL ENCOUNTER (OUTPATIENT)
Dept: RADIATION ONCOLOGY | Age: 49
Discharge: HOME | End: 2019-10-25
Payer: COMMERCIAL

## 2019-10-25 VITALS
WEIGHT: 125.9 LBS | TEMPERATURE: 98.1 F | DIASTOLIC BLOOD PRESSURE: 69 MMHG | HEART RATE: 72 BPM | OXYGEN SATURATION: 99 % | BODY MASS INDEX: 20.95 KG/M2 | SYSTOLIC BLOOD PRESSURE: 114 MMHG

## 2019-10-25 PROCEDURE — 99211 OFF/OP EST MAY X REQ PHY/QHP: CPT

## 2019-10-25 NOTE — CONSULTS
Patient: Felice Aguilar MRN: [id number]  SSN: [id number]    YOB: 1970  Age: 51 y.o. Sex: female      Other Providers:  Celena Arredondo MD    CHIEF COMPLAINT: Breast cancer    DIAGNOSIS: Right breast DCIS, pTisNxMx, Stage 0, ER 94%, ID 83%    PREVIOUS TREATMENT:  1) 8/21/19:  Right breast biopsy  2) 10/9/19:  Right breast lumpectomy    HISTORY OF PRESENT ILLNESS:  Felice Aguilar is a 51 y.o. female who I am seeing at the request of Dr. Harriet Richardson.  She originally presented for routine mammography where an abnormality with calcifications was noted in the right breas at the right central 9:00 position. Her 3-D mammography was completed 8/15/2019 and subsequent biopsy 8/15/19 confirmed ductal carcinoma in situ, ER 94%, ID 83%. Staging MRI showed a degree of enhancement measuring 4 cm around the initial biopsy site. Surgical options were discussed and ultimately she elected to proceed with breast conserving surgery. She completed right breast lumpectomy 10/9/19. Final pathology revealed no residual ductal carcinoma in situ    She was seen back in follow-up 10/16/2019 and was noted to be doing well. She was presented in conference and recommendations were made for referral to medical and radiation oncology which was the rationale for our consultation 10/25/2019. She was accompanied by her  and research was present. OBSTETRIC HISTORY:    Menarche at the age of 14.    G4,P3. Oral Contraceptive Pills:  Yes  Hormone Replacement Therapy:  None    PAST MEDICAL HISTORY:    Past Medical History:   Diagnosis Date    Breast cancer ([facility] [address] 09/2019    Rt breast    Cancer ([facility] [address]     right breast DCIS    Ductal carcinoma in situ of breast 09/2019    Rt breast       The patient denies history of collagen vascular diseases, pacemaker insertion, prior radiation or prior chemotherapy.      PAST SURGICAL HISTORY:   Past Surgical History:   Procedure Laterality Date    HX BREAST BIOPSY Right     DCIS    HX BREAST BIOPSY Right 10/9/2019    RIGHT BREAST NEEDLE LOCALIZED BIOPSY  PREOP 7:30/ LOC 8:30/ SURGERY 10:30 performed by Richard Johnson DO at [address] HX COLONOSCOPY      x2    HX HEENT  2009    lasix     HX MASTECTOMY Right 10/9/2019    RIGHT PARTIAL BREAST MASTECTOMY performed by Richard Johnsno DO at [address] HX ORTHOPAEDIC Right 2007    knee       MEDICATIONS:   No current outpatient medications on file.     ALLERGIES:   No Known Allergies    SOCIAL HISTORY:   Social History     Socioeconomic History    Marital status:      Spouse name: Not on file    Number of children: Not on file    Years of education: Not on file    Highest education level: Not on file   Occupational History    Not on file   Social Needs    Financial resource strain: Not on file    Food insecurity:     Worry: Not on file     Inability: Not on file    Transportation needs:     Medical: Not on file     Non-medical: Not on file   Tobacco Use    Smoking status: Never Smoker    Smokeless tobacco: Never Used   Substance and Sexual Activity    Alcohol use: Yes     Comment: ocass    Drug use: Never    Sexual activity: Not on file   Lifestyle    Physical activity:     Days per week: Not on file     Minutes per session: Not on file    Stress: Not on file   Relationships    Social connections:     Talks on phone: Not on file     Gets together: Not on file     Attends Christianity service: Not on file     Active member of club or organization: Not on file     Attends meetings of clubs or organizations: Not on file     Relationship status: Not on file    Intimate partner violence:     Fear of current or ex partner: Not on file     Emotionally abused: Not on file     Physically abused: Not on file     Forced sexual activity: Not on file   Other Topics Concern    Not on file   Social History Narrative    Not on file       FAMILY HISTORY:   Family History   Problem Relation Age of Onset    Cancer Maternal Grandfather         colon    Breast Cancer Neg Hx        REVIEW OF SYSTEMS: Please see the completed review of systems sheet in the chart that I have reviewed today. PHYSICAL EXAMINATION:   ECOG Performance status 0  VITAL SIGNS: There were no vitals taken for this visit. GENERAL: The patient is well-developed, ambulatory, alert and in no acute distress. HEENT: Head is normocephalic, atraumatic. Pupils are equal, round and reactive to light and accommodation. Extraocular movement intact. Hearing is intact bilaterally to finger rub. Oral cavity reveals no lesions. Mucous membranes are moist. NECK: Neck is supple with no masses. CARDIOVASCULAR: Heart is regular rate and rhythm. There are no murmurs rubs or gallups. Radial pulses are 2+ RESPIRATORY: Lungs are clear to auscultation and percussion. There is normal respiratory effort. GASTROINTESTINAL: The abdomen is soft, non-tender, nondistended with no hepatospelnomagaly. Digital rectal examination: deferred LYMPHATIC: There is no cervical, supraclavicular or axillary lymphadenopathy bilaterally. MUSCULOSKELETAL: Extremities reveal no cyanosis, clubbing or edema.  is 5+/5. BREASTS: Examination of the unaffected breast reveals no dominant nodules or masses. The lumpectomy cavity appears well healed despite some bruising with good aesthetics and symmetry. Well healed surgical incision. NEURO:  Cranial nerves II-XII grossly intact. Muscular strength and sensation are intact throughout all four extremities. PATHOLOGY:    8/21/19:     DIAGNOSIS   A: \"RIGHT BREAST CALCIFICATIONS AT 9:00 POSITION, CORE BIOPSY\":   DUCTAL CARCINOMA IN SITU, LOW-GRADE (CRIBRIFORM TYPE) WITH ASSOCIATED MICROCALCIFICATIONS   will/8/22/2019   Electronically signed out on 8/22/2019 06:28 by KIMBERLY Waller M.D.    Procedures/Addenda   STF-IMMUNOHISTOCHEMISTRY   STF- ER/CT/[id number] BY IHC   Status: Signed Out Peter Mcmahan MD on 8/26/2019   Interpretation Accept Software IHC Quantitative Breast Panel Result:   TEST NAME: RESULTS: INTERNAL CONTROLS:   ESTROGEN RECEPTOR: Positive (94%) Present, positive   PROGESTERONE RECEPTOR: Positive (83%) Present, positive     10-9/19:      DIAGNOSIS   RIGHT BREAST PARTIAL MASTECTOMY: CHANGES CONSISTENT WITH PRIOR BIOPSY SITE; FOCAL ATYPICAL DUCTAL HYPERPLASIA, MARGINS UNINVOLVED; NO RESIDUAL DUCTAL CARCINOMA IN SITU IDENTIFIED. LABORATORY: No results found for: NA, K, CL, CO2, AGAP, GLU, BUN, CREA, GFRAA, GFRNA, CA, MG, PHOS, ALB, TBIL, TP, ALB, GLOB, AGRAT, SGOT, ALT, GPT  Lab Results   Component Value Date/Time    WBC 8.9 01/14/2009 07:40 AM    HGB 13.5 01/14/2009 07:40 AM    HCT 38.5 01/14/2009 07:40 AM    PLATELET 780 54/00/7076 07:40 AM       RADIOLOGY:    Mri Breast Bi W Wo Cont    Result Date: 8/26/2019  BREAST MRI BEFORE AND AFTER CONTRAST CLINICAL HISTORY:  Follow-up recent needle biopsy diagnosis of right breast low-grade DCIS; for preoperative evaluation. TECHNIQUE: Standard axial and sagittal images were obtained before and during bolus injection of 15 cc of Dotarem IV. CAD was employed. COMPARISON:  Multiple prior studies including 3-D screening mammogram of July 31, 2013, right additional views of August 15, 2019, and right stereotactic biopsy of August 21, 2019. FINDINGS: There is extensive background parenchymal enhancement bilaterally. There is reticulonodular enhancement extending linearly in a radial configuration posteriorly and slightly superiorly from the right central 9:00 biopsy marker clip which is suspicious for extension of the DCIS. Review of recent 3-D screening mammogram and right additional views with magnification suggests possible associated microcalcifications. Note dominant suspicious enhancement is seen in the left breast.  No pathologically enlarged or dysmorphic lymph nodes are seen. The liver, lungs, and chest wall appear unremarkable as imaged. IMPRESSION:  1. RETICULONODULAR ENHANCEMENT EXTENDING APPROXIMATELY 4 CM POSTERIORLY FROM THE RIGHT CENTRAL 9:00 BIOPSY MARKER CLIP IS SUSPICIOUS FOR EXTENSION OF DCIS. THERE ARE POSSIBLE ASSOCIATED MICROCALCIFICATIONS, AND THEREFORE  CRANIOCAUDAL, STRAIGHT LATERAL, AND MAGNIFICATION SPOT COMPRESSION VIEWS ARE RECOMMENDED EITHER BEFORE OR AT THE TIME OF ANTICIPATED PREOPERATIVE MAMMOGRAPHIC WIRE LOCALIZATION. 2.  EXTENSIVE BILATERAL BREAST PARENCHYMAL ENHANCEMENT LIMITS MRI EVALUATION FOR POSSIBLE SMALL FOCI OF TUMOR, AND THEREFORE FOLLOW-UP MRI IS RECOMMENDED IN ONE YEAR. Right: BI-RADS Assessment Category 6: Known Biopsy Proven Malignancy. Bilateral parenchyma: BI-RADS Assessment Category 3: Probably benign- Short-interval follow-up suggested (1 year). [facility] Mammo Rt Dx Incl Cad    Result Date: 8/15/2019  DIAGNOSTIC DIGITAL right MAMMOGRAPHY CLINICAL INDICATION: Additional evaluation of right lateral calcifications on new baseline screening with no personal malignancy or breast surgery, no family breast cancer COMPARISON: 7/31/2019 FINDINGS: Mammogram: Digital diagnostic mammography was performed, and is interpreted in conjunction with a computer assisted detection (CAD) system. Additional right mediolateral and magnification views were performed. In the lateral breast at 9:30 position middle depth there is a small subcentimeter grouping of faint calcifications which are pleomorphic, some but not all demonstrating layering, and therefore indeterminate. Biopsy is warranted. Images were reviewed in consultation with colleagues, who agreed. Elsewhere in the right breast there are minimal scattered typically benign calcifications, with no concerning grouping. There is no evidence of a mass, distortion, or skin thickening. IMPRESSION: Right 9:30 indeterminate calcifications. Recommend stereotactic biopsy. This was reported to the patient at the time of interpretation.  BI-RADS Assessment Category 4: Suspicious Finding- Biopsy should be considered. [facility] Breast Rt Surg Spec    Result Date: 10/9/2019  Mammographic guided needle localization Surgical specimen radiograph CLINICAL INDICATION: Right 9:00 breast cancer undergoing surgical excision, with recent MRI demonstrating reticular nonmass enhancement posterior to the biopsy clip. COMPARISON: MRI 8/26/2019, mammography 20 1/20/2019 and 8/15/2019 PROCEDURE: After discussing the risks and benefits, including but not limited to bleeding and infection, written and verbal informed consent were obtained. The overlying skin was prepped in sterile fashion. Total of 6 mL of 1% Lidocaine was used for local anesthesia. Under mammographic guidance, a 5 cm Argyle wire was placed adjacent to the clip. A 7 cm homer wire was then placed adjacent to the expected posterior aspect of the MRI enhancement. Postprocedural mammographic films were then obtained and marked for the surgeon. The location of the expected enhancement was discussed in person with the surgeon in the reading room at 10:35 AM. The patient tolerated the procedure well without immediate complications and was transported to the operating room. IMPRESSION: Successful mammographic-guided needle localization of right 9:00 breast cancer. Pathology results are pending. Submitted specimen radiograph demonstrates intact localization wires and the biopsy clip. This was reported to Dr. Eve Cooley in the operating room at the time of acquisition. [facility] Breast Rt Surg Spec    Result Date: 8/21/2019  RIGHT BREAST STEREOTACTIC BIOPSY, RIGHT BREAST SURGICAL SPECIMEN AND RIGHT BREAST POST BIOPSY MAMMOGRAM, 8/21/2019. CLINICAL HISTORY: Right breast calcifications. PROCEDURE: RIGHT BREAST STEREOTACTIC BIOPSY: After obtaining written informed consent, the patient was placed in a prone position on the stereotactic biopsy table.  Preliminary imaging demonstrates the calcifications in the outer soft tissues of the right breast. The right breast was placed in mediolateral compression and a lateral approach was used. The right breast was prepped and draped in the usual sterile fashion. 1% Lidocaine was used for local anesthesia. Using stereotactic guidance, an 9 gauge Eviva needle was positioned adjacent to the calcifications. Four, vacuum assisted core biopsy samples were obtained from about the periphery of the needle with calcifications confirmed on specimen radiograph detailed below. A biopsy clip was placed. The biopsy apparatus was removed and hemostasis was achieved. No procedure, or immediate postprocedure complications were noted. RIGHT BREAST SPECIMEN RADIOGRAPH: Magnification radiographs of the breast surgical specimen demonstrate calcifications in the sample(s) obtained. The samples containing calcifications were placed in a container of formalin labeled \"A. \"  The samples were sent to histology for further evaluation. POST BIOPSY MAMMOGRAPH: Standard views of the right breast were obtained. These show biopsy changes in the outer soft tissues of the right breast.  There has been successful placement of the biopsy clip which closely approximates the abnormal calcifications as seen on pre-biopsy imaging. No significant post biopsy hematoma is seen. IMPRESSION: 1. Successful right breast stereotactic guided biopsy with histology results pending. [facility] Post Bx Imaging Rt Incl Cad    Result Date: 8/21/2019  RIGHT BREAST STEREOTACTIC BIOPSY, RIGHT BREAST SURGICAL SPECIMEN AND RIGHT BREAST POST BIOPSY MAMMOGRAM, 8/21/2019. CLINICAL HISTORY: Right breast calcifications. PROCEDURE: RIGHT BREAST STEREOTACTIC BIOPSY: After obtaining written informed consent, the patient was placed in a prone position on the stereotactic biopsy table. Preliminary imaging demonstrates the calcifications in the outer soft tissues of the right breast. The right breast was placed in mediolateral compression and a lateral approach was used.   The right breast was prepped and draped in the usual sterile fashion. 1% Lidocaine was used for local anesthesia. Using stereotactic guidance, an 9 gauge Eviva needle was positioned adjacent to the calcifications. Four, vacuum assisted core biopsy samples were obtained from about the periphery of the needle with calcifications confirmed on specimen radiograph detailed below. A biopsy clip was placed. The biopsy apparatus was removed and hemostasis was achieved. No procedure, or immediate postprocedure complications were noted. RIGHT BREAST SPECIMEN RADIOGRAPH: Magnification radiographs of the breast surgical specimen demonstrate calcifications in the sample(s) obtained. The samples containing calcifications were placed in a container of formalin labeled \"A. \"  The samples were sent to histology for further evaluation. POST BIOPSY MAMMOGRAPH: Standard views of the right breast were obtained. These show biopsy changes in the outer soft tissues of the right breast.  There has been successful placement of the biopsy clip which closely approximates the abnormal calcifications as seen on pre-biopsy imaging. No significant post biopsy hematoma is seen. IMPRESSION: 1. Successful right breast stereotactic guided biopsy with histology results pending. [facility] Stereo Vac  Bx Breast Rt 1st Lesion W/clip And Specimen    Addendum Date: 8/27/2019    Addendum: Renee Prader, accession number: [id number] Date: 8/23/2019 Pathology was noted as A: Right breast calcifications at 9:00 position, core biopsy: Ductal carcinoma in situ, low grade (cribriform type) with associated microcalcifications. Results concordant with imaging. Pathology report was called to  Jesús Carmen NP's office on 8/22/2019 by DENNY Cobian. Patient is to follow up with Jesús Carmen NP for surgical referral.     Result Date: 8/27/2019  RIGHT BREAST STEREOTACTIC BIOPSY, RIGHT BREAST SURGICAL SPECIMEN AND RIGHT BREAST POST BIOPSY MAMMOGRAM, 8/21/2019.  CLINICAL HISTORY: Right breast calcifications. PROCEDURE: RIGHT BREAST STEREOTACTIC BIOPSY: After obtaining written informed consent, the patient was placed in a prone position on the stereotactic biopsy table. Preliminary imaging demonstrates the calcifications in the outer soft tissues of the right breast. The right breast was placed in mediolateral compression and a lateral approach was used. The right breast was prepped and draped in the usual sterile fashion. 1% Lidocaine was used for local anesthesia. Using stereotactic guidance, an 9 gauge Eviva needle was positioned adjacent to the calcifications. Four, vacuum assisted core biopsy samples were obtained from about the periphery of the needle with calcifications confirmed on specimen radiograph detailed below. A biopsy clip was placed. The biopsy apparatus was removed and hemostasis was achieved. No procedure, or immediate postprocedure complications were noted. RIGHT BREAST SPECIMEN RADIOGRAPH: Magnification radiographs of the breast surgical specimen demonstrate calcifications in the sample(s) obtained. The samples containing calcifications were placed in a container of formalin labeled \"A. \"  The samples were sent to histology for further evaluation. POST BIOPSY MAMMOGRAPH: Standard views of the right breast were obtained. These show biopsy changes in the outer soft tissues of the right breast.  There has been successful placement of the biopsy clip which closely approximates the abnormal calcifications as seen on pre-biopsy imaging. No significant post biopsy hematoma is seen. IMPRESSION: 1. Successful right breast stereotactic guided biopsy with histology results pending. [facility] Ndl/wire/clip/seed Breast Rt    Result Date: 10/9/2019  Mammographic guided needle localization Surgical specimen radiograph CLINICAL INDICATION: Right 9:00 breast cancer undergoing surgical excision, with recent MRI demonstrating reticular nonmass enhancement posterior to the biopsy clip. COMPARISON: MRI 8/26/2019, mammography 20 1/20/2019 and 8/15/2019 PROCEDURE: After discussing the risks and benefits, including but not limited to bleeding and infection, written and verbal informed consent were obtained. The overlying skin was prepped in sterile fashion. Total of 6 mL of 1% Lidocaine was used for local anesthesia. Under mammographic guidance, a 5 cm York wire was placed adjacent to the clip. A 7 cm homer wire was then placed adjacent to the expected posterior aspect of the MRI enhancement. Postprocedural mammographic films were then obtained and marked for the surgeon. The location of the expected enhancement was discussed in person with the surgeon in the reading room at 10:35 AM. The patient tolerated the procedure well without immediate complications and was transported to the operating room. IMPRESSION: Successful mammographic-guided needle localization of right 9:00 breast cancer. Pathology results are pending. Submitted specimen radiograph demonstrates intact localization wires and the biopsy clip. This was reported to Dr. Candelario Banuelos in the operating room at the time of acquisition. [facility] 3d Huy W Mammo Bi Screening Incl Cad    Result Date: 8/13/2019  STUDY:  Bilateral digital screening mammogram with CAD and Tomosynthesis 7/31/2019 INDICATION:  Routine screening mammogram. COMPARISON:  Bilateral mammograms 2/20/2013. Additional prior studies may have been reviewed, as needed, for completion of this report. FINDINGS: Bilateral digital screening mammography, interpreted in conjunction with CAD overread. The breasts are dense which decreases the sensitivity of mammography. The axilla contain benign appearing lymph nodes. No evidence of evolving skin thickening, or nipple retraction is seen. A new small cluster of calcifications is seen in the outer, mid right breast which should be further characterized.  An additional new lucent center calcification is seen in the medial right breast although this is felt to have a typically benign appearance and not felt to be worrisome. No evolving dominant mass, spiculated density, or architectural distortion is seen. Bilateral breast tomosynthesis was performed, and is interpreted in conjunction with a computer assisted detection (CAD) system. The prior small cluster of calcifications in the right outer right breast is once again demonstrated and cannot be confirmed to be benign. No associated worrisome asymmetry or architectural changes is suggested. Otherwise, no suspicious masses, calcifications, or architectural distortion are identified otherwise. IMPRESSION:  1.  New small cluster of calcifications in the outer, mid right breast.  Further evaluation with spot magnification diagnostic mammogram is recommended. We are mailing breast density information to the patient along with the mammogram report. A reminder letter will be scheduled at the appropriate time pending additional views. BI-RADS Assessment Category 0: Incomplete: Needs additional imaging evaluation. BD4       IMPRESSION: Diana Alfonso is a 51 y.o. female with DCIS. The natural history of DCIS was reviewed with the patient. This was contrasted with invasive breast cancer. Prognostic factors including grade, size and margin status were reviewed. Various treatment options including observation or adjuvant radiation therapy were compared and contrasted with regard to outcome and qualify of life. We discussed NSABP B-17 and the improved local control seen for patients receiving adjuvant radiation as compared to observation following lumpectomy. We also discussed DCISion RT which is a genetic pathologic review determining risk of recurrence with values either consider elevated or low risk. This further delineates an absolute benefit to radiation based on this risk stratification.   She wanted to due to radiation but was open to further information with the study and therefore will be enrolled. I have recommended a course of adjuvant radiation therapy for improved local control. I have offered her hypo-fractionated radiation employing 42.56 Gy to be delivered in 16 fractions followed by a 10 Gy boost to the lumpectomy bed. The benefits, side effects and complications of radiation therapy were discussed, damage to underlying rib and lung as well as potential changes in cosmesis among the complications highlighted. All of her questions were answered to her satisfaction and written informed consent was obtained. She will be meeting with medical oncology, Dr. Facundo Guerrier, to discuss endocrine therapy as well. Plan:  1) Genetic testing-not indicated  2) Smoking cessation-not indicated  3) Reviewed available research treatment and cancer care protocols for which patient may be eligible. Will enroll on Prelude, DCISion RT protocol. 4) Patient will be simulated shortly with radiotherapy to begin shortly thereafter. A dose of 42.56 Gray to the whole breast in 16 fractions followed by a 10 Gy boost to the tumor bed will be prescribed.     Marc Carrington MD  10/25/19

## 2019-10-25 NOTE — NURSE NAVIGATOR
Consult DCIS right breast.  Research present for consult. Biopsy 19. Lumpectomy 10-9-19. New pt apt with Dr. Dary Pena 19. Pt is  3/ Para 2. Started menses around 14 yoa. Last period one month ago. Had menses 6 months prior to that. Periods are normal but end quickly. History of oral contraceptive use. History of fertility treatments-IUI. CONSENTS SIGNED FOR RT TO THE RIGHT BREAST.   CT [facility] SCHEDULED Wednesday, 10-30-19 AT 9 AM.  APT GIVEN TO PT.    Bella Biggs RN

## 2019-10-30 ENCOUNTER — HOSPITAL ENCOUNTER (OUTPATIENT)
Dept: RADIATION ONCOLOGY | Age: 49
Discharge: HOME | End: 2019-10-30
Payer: COMMERCIAL

## 2019-10-30 ENCOUNTER — DOCUMENTATION ONLY (OUTPATIENT)
Dept: REGISTRATION | Age: 49
End: 2019-10-30

## 2019-10-30 PROCEDURE — 77290 THER RAD SIMULAJ FIELD CPLX: CPT

## 2019-10-30 PROCEDURE — 77332 RADIATION TREATMENT AID(S): CPT

## 2019-10-30 NOTE — PROGRESS NOTES
I spoke with Cynthia Syed regarding her insurance coverage, potential oral medication authorizations, enrollment in the [address] [place], [place] [address] ([facility]) and the [id number] [place] [address] ([address]), and assistance organization resource sheet. At this time, Cynthia Syed does not wish to enroll into the [address] ([facility]), or the [address] [facility]). Next, I spoke with Cynthia Syed regarding potential oral medication authorizations. I told her that if she ever had any problems getting her oral medications filled to give the dedicated [facility]  a call. Most of the time, it is simply an authorization that needs to be done with the insurance company. Next, I gave Cynthia Syed flyers about the free Yoga classes for cancer survivors and the Oncology Massage program.  I let her know that she can get a free 30 minute massage. Lastly, I gave Cynthia Syed a form with various resource organizations that could assist with specific needs (example:  transportation, lodging, preparing meals, home cleaning)     Cynthia Syed expressed understanding of the information above and all questions were answered to her satisfaction.

## 2019-11-08 ENCOUNTER — HOSPITAL ENCOUNTER (OUTPATIENT)
Dept: RADIATION ONCOLOGY | Age: 49
Discharge: HOME | End: 2019-11-08
Payer: COMMERCIAL

## 2019-11-08 PROCEDURE — 77334 RADIATION TREATMENT AID(S): CPT

## 2019-11-08 PROCEDURE — 77300 RADIATION THERAPY DOSE PLAN: CPT

## 2019-11-08 PROCEDURE — 77295 3-D RADIOTHERAPY PLAN: CPT

## 2019-11-12 ENCOUNTER — HOSPITAL ENCOUNTER (OUTPATIENT)
Dept: RADIATION ONCOLOGY | Age: 49
Discharge: HOME | End: 2019-11-12
Payer: COMMERCIAL

## 2019-11-12 PROCEDURE — 77280 THER RAD SIMULAJ FIELD SMPL: CPT

## 2019-11-12 PROCEDURE — 77412 RADIATION TX DELIVERY LVL 3: CPT

## 2019-11-12 NOTE — PROGRESS NOTES
Patient: Felisha Dee MRN: [id number]  SSN: [id number]    YOB: 1970  Age: 51 y.o. Sex: female      DIAGNOSIS: Right breast DCIS, pTisNxMx, Stage 0, ER 94%, [place] 83%    TREATMENT SITE:  Right breast    DOSE and FRACTIONATION:  1/16 fractions, 266 cGy of 4256 cGy planned, 0/4 boost, 0 cGy of 1000 cGy planned. INTERVAL HISTORY:  Felisha Dee is a 51 y.o. female being treated for DCIS. She was doing well without complaints week 1. OBJECTIVE:  No findings week 1. There were no vitals taken for this visit. No results found for: NA, K, CL, CO2, AGAP, GLU, BUN, CREA, GFRAA, GFRNA, CA, MG, PHOS, ALB, TBIL, TP, ALB, GLOB, AGRAT, SGOT, ALT, GPT  Lab Results   Component Value Date/Time    WBC 8.9 01/14/2009 07:40 AM    HGB 13.5 01/14/2009 07:40 AM    HCT 38.5 01/14/2009 07:40 AM    PLATELET 870 40/96/2817 07:40 AM       ASSESSMENT and PLAN:  Felisha Dee is tolerating radiation as anticipated for the current dose and fraction. We will continue on as planned with another treatment visit anticipated next week.         Jason Avila MD   November 12, 2019

## 2019-11-13 ENCOUNTER — HOSPITAL ENCOUNTER (OUTPATIENT)
Dept: RADIATION ONCOLOGY | Age: 49
Discharge: HOME | End: 2019-11-13
Payer: COMMERCIAL

## 2019-11-13 PROCEDURE — 77331 SPECIAL RADIATION DOSIMETRY: CPT

## 2019-11-13 PROCEDURE — 77412 RADIATION TX DELIVERY LVL 3: CPT

## 2019-11-14 ENCOUNTER — HOSPITAL ENCOUNTER (OUTPATIENT)
Dept: RADIATION ONCOLOGY | Age: 49
Discharge: HOME | End: 2019-11-14
Payer: COMMERCIAL

## 2019-11-14 PROCEDURE — 77412 RADIATION TX DELIVERY LVL 3: CPT

## 2019-11-15 ENCOUNTER — APPOINTMENT (OUTPATIENT)
Dept: RADIATION ONCOLOGY | Age: 49
End: 2019-11-15
Payer: COMMERCIAL

## 2019-11-15 ENCOUNTER — HOSPITAL ENCOUNTER (OUTPATIENT)
Dept: RADIATION ONCOLOGY | Age: 49
Discharge: HOME | End: 2019-11-15
Payer: COMMERCIAL

## 2019-11-15 PROCEDURE — 77412 RADIATION TX DELIVERY LVL 3: CPT

## 2019-11-18 ENCOUNTER — APPOINTMENT (OUTPATIENT)
Dept: RADIATION ONCOLOGY | Age: 49
End: 2019-11-18
Payer: COMMERCIAL

## 2019-11-18 ENCOUNTER — HOSPITAL ENCOUNTER (OUTPATIENT)
Dept: RADIATION ONCOLOGY | Age: 49
Discharge: HOME | End: 2019-11-18
Payer: COMMERCIAL

## 2019-11-18 PROCEDURE — 77412 RADIATION TX DELIVERY LVL 3: CPT

## 2019-11-18 PROCEDURE — 77336 RADIATION PHYSICS CONSULT: CPT

## 2019-11-18 NOTE — PROGRESS NOTES
Patient: Davide Hanson MRN: [id number]  SSN: [id number]    YOB: 1970  Age: 51 y.o. 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 INTERVAL HISTORY:  Davide Hanson is a 51 y.o. female being treated for DCIS. She was doing well without complaints week 1. OBJECTIVE:  No findings week 1. There were no vitals taken for this visit. No results found for: NA, K, CL, CO2, AGAP, GLU, BUN, CREA, GFRAA, GFRNA, CA, MG, PHOS, ALB, TBIL, TP, ALB, GLOB, AGRAT, SGOT, ALT, GPT  Lab Results   Component Value Date/Time    WBC 8.9 01/14/2009 07:40 AM    HGB 13.5 01/14/2009 07:40 AM    HCT 38.5 01/14/2009 07:40 AM    PLATELET 859 68/73/5804 07:40 AM       ASSESSMENT and PLAN:  Davide Hanson is tolerating radiation as anticipated for the current dose and fraction. We will continue on as planned with another treatment visit anticipated next week.         Marc Carrington MD   November 18, 2019

## 2019-11-19 ENCOUNTER — APPOINTMENT (OUTPATIENT)
Dept: RADIATION ONCOLOGY | Age: 49
End: 2019-11-19
Payer: COMMERCIAL

## 2019-11-19 ENCOUNTER — HOSPITAL ENCOUNTER (OUTPATIENT)
Dept: RADIATION ONCOLOGY | Age: 49
Discharge: HOME | End: 2019-11-19
Payer: COMMERCIAL

## 2019-11-19 PROCEDURE — 77417 THER RADIOLOGY PORT IMAGE(S): CPT

## 2019-11-19 PROCEDURE — 77412 RADIATION TX DELIVERY LVL 3: CPT

## 2019-11-20 ENCOUNTER — APPOINTMENT (OUTPATIENT)
Dept: RADIATION ONCOLOGY | Age: 49
End: 2019-11-20
Payer: COMMERCIAL

## 2019-11-20 ENCOUNTER — HOSPITAL ENCOUNTER (OUTPATIENT)
Dept: RADIATION ONCOLOGY | Age: 49
Discharge: HOME | End: 2019-11-20
Payer: COMMERCIAL

## 2019-11-20 ENCOUNTER — APPOINTMENT (RX ONLY)
Dept: URBAN - METROPOLITAN AREA CLINIC 24 | Facility: CLINIC | Age: 49
Setting detail: DERMATOLOGY
End: 2019-11-20

## 2019-11-20 DIAGNOSIS — Z85.828 PERSONAL HISTORY OF OTHER MALIGNANT NEOPLASM OF SKIN: ICD-10-CM

## 2019-11-20 PROCEDURE — 99212 OFFICE O/P EST SF 10 MIN: CPT

## 2019-11-20 PROCEDURE — ? COUNSELING

## 2019-11-20 PROCEDURE — 77412 RADIATION TX DELIVERY LVL 3: CPT

## 2019-11-20 ASSESSMENT — LOCATION ZONE DERM: LOCATION ZONE: FEET

## 2019-11-20 ASSESSMENT — LOCATION DETAILED DESCRIPTION DERM: LOCATION DETAILED: RIGHT LATERAL DORSAL FOOT

## 2019-11-20 ASSESSMENT — LOCATION SIMPLE DESCRIPTION DERM: LOCATION SIMPLE: RIGHT FOOT

## 2019-11-21 ENCOUNTER — HOSPITAL ENCOUNTER (OUTPATIENT)
Dept: RADIATION ONCOLOGY | Age: 49
Discharge: HOME | End: 2019-11-21
Payer: COMMERCIAL

## 2019-11-21 ENCOUNTER — APPOINTMENT (OUTPATIENT)
Dept: RADIATION ONCOLOGY | Age: 49
End: 2019-11-21
Payer: COMMERCIAL

## 2019-11-21 PROCEDURE — 77412 RADIATION TX DELIVERY LVL 3: CPT

## 2019-11-22 ENCOUNTER — HOSPITAL ENCOUNTER (OUTPATIENT)
Dept: RADIATION ONCOLOGY | Age: 49
Discharge: HOME | End: 2019-11-22
Payer: COMMERCIAL

## 2019-11-22 ENCOUNTER — APPOINTMENT (OUTPATIENT)
Dept: RADIATION ONCOLOGY | Age: 49
End: 2019-11-22
Payer: COMMERCIAL

## 2019-11-22 PROCEDURE — 77412 RADIATION TX DELIVERY LVL 3: CPT

## 2019-11-25 ENCOUNTER — APPOINTMENT (OUTPATIENT)
Dept: RADIATION ONCOLOGY | Age: 49
End: 2019-11-25
Payer: COMMERCIAL

## 2019-11-25 ENCOUNTER — HOSPITAL ENCOUNTER (OUTPATIENT)
Dept: RADIATION ONCOLOGY | Age: 49
Discharge: HOME | End: 2019-11-25
Payer: COMMERCIAL

## 2019-11-25 PROCEDURE — 77321 SPECIAL TELETX PORT PLAN: CPT

## 2019-11-25 PROCEDURE — 77334 RADIATION TREATMENT AID(S): CPT

## 2019-11-25 PROCEDURE — 77412 RADIATION TX DELIVERY LVL 3: CPT

## 2019-11-25 PROCEDURE — 77336 RADIATION PHYSICS CONSULT: CPT

## 2019-11-25 NOTE — PROGRESS NOTES
Patient: Kait Sims MRN: [id number]  SSN: [id number]    YOB: 1970  Age: 47 y.o. 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 INTERVAL HISTORY:  Kait Sims is a 47 y.o. female being treated for DCIS. She was doing well without complaints week 1-2 other than fatigue developing and hot flashes worsening week 2. OBJECTIVE:  No findings week 1. There were no vitals taken for this visit. No results found for: NA, K, CL, CO2, AGAP, GLU, BUN, CREA, GFRAA, GFRNA, CA, MG, PHOS, ALB, TBIL, TP, ALB, GLOB, AGRAT, SGOT, ALT, GPT  Lab Results   Component Value Date/Time    WBC 8.9 01/14/2009 07:40 AM    HGB 13.5 01/14/2009 07:40 AM    HCT 38.5 01/14/2009 07:40 AM    PLATELET 104 33/13/5921 07:40 AM       ASSESSMENT and PLAN:  aKit Sims is tolerating radiation as anticipated for the current dose and fraction. We will continue on as planned with another treatment visit anticipated next week.         Tete Fontana MD   November 25, 2019

## 2019-11-26 ENCOUNTER — APPOINTMENT (OUTPATIENT)
Dept: RADIATION ONCOLOGY | Age: 49
End: 2019-11-26
Payer: COMMERCIAL

## 2019-11-26 ENCOUNTER — HOSPITAL ENCOUNTER (OUTPATIENT)
Dept: RADIATION ONCOLOGY | Age: 49
Discharge: HOME | End: 2019-11-26
Payer: COMMERCIAL

## 2019-11-26 PROCEDURE — 77412 RADIATION TX DELIVERY LVL 3: CPT

## 2019-11-27 ENCOUNTER — APPOINTMENT (OUTPATIENT)
Dept: RADIATION ONCOLOGY | Age: 49
End: 2019-11-27
Payer: COMMERCIAL

## 2019-11-27 ENCOUNTER — HOSPITAL ENCOUNTER (OUTPATIENT)
Dept: RADIATION ONCOLOGY | Age: 49
Discharge: HOME | End: 2019-11-27
Payer: COMMERCIAL

## 2019-11-27 PROCEDURE — 77412 RADIATION TX DELIVERY LVL 3: CPT

## 2019-11-28 ENCOUNTER — APPOINTMENT (OUTPATIENT)
Dept: RADIATION ONCOLOGY | Age: 49
End: 2019-11-28
Payer: COMMERCIAL

## 2019-11-29 ENCOUNTER — APPOINTMENT (OUTPATIENT)
Dept: RADIATION ONCOLOGY | Age: 49
End: 2019-11-29
Payer: COMMERCIAL

## 2019-12-02 ENCOUNTER — HOSPITAL ENCOUNTER (OUTPATIENT)
Dept: RADIATION ONCOLOGY | Age: 49
Discharge: HOME | End: 2019-12-02
Payer: COMMERCIAL

## 2019-12-02 ENCOUNTER — APPOINTMENT (OUTPATIENT)
Dept: RADIATION ONCOLOGY | Age: 49
End: 2019-12-02
Payer: COMMERCIAL

## 2019-12-02 PROCEDURE — 77417 THER RADIOLOGY PORT IMAGE(S): CPT

## 2019-12-02 PROCEDURE — 77412 RADIATION TX DELIVERY LVL 3: CPT

## 2019-12-02 NOTE — PROGRESS NOTES
Patient: Christopher Apodaca MRN: [id number]  SSN: [id number]    YOB: 1970  Age: 47 y.o. Sex: female      DIAGNOSIS: Right breast DCIS, pTisNxMx, Stage 0, ER 94%, [place] 83%    TREATMENT SITE:  Right breast    DOSE and FRACTIONATION:  13/16 fractions, 3458 cGy of 4256 cGy planned, 0/4 boost, 0 cGy of 1000 cGy planned. INTERVAL HISTORY:  Christopher Apodaca is a 47 y.o. female being treated for DCIS. She was doing well without complaints week 1-2 other than fatigue developing and hot flashes worsening week 2. Week 3 with no issues. OBJECTIVE:  No findings week 1. There were no vitals taken for this visit. No results found for: NA, K, CL, CO2, AGAP, GLU, BUN, CREA, GFRAA, GFRNA, CA, MG, PHOS, ALB, TBIL, TP, ALB, GLOB, AGRAT, SGOT, ALT, GPT  Lab Results   Component Value Date/Time    WBC 8.9 01/14/2009 07:40 AM    HGB 13.5 01/14/2009 07:40 AM    HCT 38.5 01/14/2009 07:40 AM    PLATELET 258 58/01/6282 07:40 AM       ASSESSMENT and PLAN:  Christopher Apodaca is tolerating radiation as anticipated for the current dose and fraction. We will continue on as planned with another treatment visit anticipated next week.         Daniele Barajas MD   December 2, 2019

## 2019-12-03 ENCOUNTER — APPOINTMENT (OUTPATIENT)
Dept: RADIATION ONCOLOGY | Age: 49
End: 2019-12-03
Payer: COMMERCIAL

## 2019-12-03 ENCOUNTER — HOSPITAL ENCOUNTER (OUTPATIENT)
Dept: RADIATION ONCOLOGY | Age: 49
Discharge: HOME | End: 2019-12-03
Payer: COMMERCIAL

## 2019-12-03 PROCEDURE — 77331 SPECIAL RADIATION DOSIMETRY: CPT

## 2019-12-03 PROCEDURE — 77412 RADIATION TX DELIVERY LVL 3: CPT

## 2019-12-04 ENCOUNTER — HOSPITAL ENCOUNTER (OUTPATIENT)
Dept: RADIATION ONCOLOGY | Age: 49
Discharge: HOME | End: 2019-12-04
Payer: COMMERCIAL

## 2019-12-04 ENCOUNTER — APPOINTMENT (OUTPATIENT)
Dept: RADIATION ONCOLOGY | Age: 49
End: 2019-12-04
Payer: COMMERCIAL

## 2019-12-04 PROCEDURE — 77412 RADIATION TX DELIVERY LVL 3: CPT

## 2019-12-04 PROCEDURE — 77336 RADIATION PHYSICS CONSULT: CPT

## 2019-12-05 ENCOUNTER — APPOINTMENT (OUTPATIENT)
Dept: RADIATION ONCOLOGY | Age: 49
End: 2019-12-05
Payer: COMMERCIAL

## 2019-12-05 ENCOUNTER — HOSPITAL ENCOUNTER (OUTPATIENT)
Dept: RADIATION ONCOLOGY | Age: 49
Discharge: HOME | End: 2019-12-05
Payer: COMMERCIAL

## 2019-12-05 PROCEDURE — 77280 THER RAD SIMULAJ FIELD SMPL: CPT

## 2019-12-05 PROCEDURE — 77412 RADIATION TX DELIVERY LVL 3: CPT

## 2019-12-06 ENCOUNTER — HOSPITAL ENCOUNTER (OUTPATIENT)
Dept: RADIATION ONCOLOGY | Age: 49
Discharge: HOME | End: 2019-12-06
Payer: COMMERCIAL

## 2019-12-06 ENCOUNTER — APPOINTMENT (OUTPATIENT)
Dept: RADIATION ONCOLOGY | Age: 49
End: 2019-12-06
Payer: COMMERCIAL

## 2019-12-06 PROCEDURE — 77412 RADIATION TX DELIVERY LVL 3: CPT

## 2019-12-09 ENCOUNTER — APPOINTMENT (OUTPATIENT)
Dept: RADIATION ONCOLOGY | Age: 49
End: 2019-12-09
Payer: COMMERCIAL

## 2019-12-09 ENCOUNTER — HOSPITAL ENCOUNTER (OUTPATIENT)
Dept: RADIATION ONCOLOGY | Age: 49
Discharge: HOME | End: 2019-12-09
Payer: COMMERCIAL

## 2019-12-09 PROCEDURE — 77412 RADIATION TX DELIVERY LVL 3: CPT

## 2019-12-10 ENCOUNTER — APPOINTMENT (OUTPATIENT)
Dept: RADIATION ONCOLOGY | Age: 49
End: 2019-12-10
Payer: COMMERCIAL

## 2019-12-10 ENCOUNTER — HOSPITAL ENCOUNTER (OUTPATIENT)
Dept: RADIATION ONCOLOGY | Age: 49
Discharge: HOME | End: 2019-12-10
Payer: COMMERCIAL

## 2019-12-10 PROCEDURE — 77412 RADIATION TX DELIVERY LVL 3: CPT

## 2019-12-11 ENCOUNTER — HOSPITAL ENCOUNTER (OUTPATIENT)
Dept: RADIATION ONCOLOGY | Age: 49
Discharge: HOME | End: 2019-12-11
Payer: COMMERCIAL

## 2019-12-11 ENCOUNTER — APPOINTMENT (OUTPATIENT)
Dept: RADIATION ONCOLOGY | Age: 49
End: 2019-12-11
Payer: COMMERCIAL

## 2019-12-11 PROCEDURE — 77412 RADIATION TX DELIVERY LVL 3: CPT

## 2019-12-11 PROCEDURE — 77336 RADIATION PHYSICS CONSULT: CPT

## 2019-12-24 NOTE — DISCHARGE SUMMARY
Patient: Lilian Ramos MRN: [id number]  SSN: [id number]    YOB: 1970  Age: 47 y.o. Sex: female      Lilian Ramos is a 47 y.o. female who was seen by radiation oncology at the request of Dr. Sonia Mckeon.  She originally presented for routine mammography where an abnormality with calcifications was noted in the right breas at the right central 9:00 position. Her 3-D mammography was completed 8/15/2019 and subsequent biopsy 8/15/19 confirmed ductal carcinoma in situ, ER 94%, FL 83%. Staging MRI showed a degree of enhancement measuring 4 cm around the initial biopsy site. Surgical options were discussed and ultimately she elected to proceed with breast conserving surgery. She completed right breast lumpectomy 10/9/19. Final pathology revealed no residual ductal carcinoma in situ    She was seen back in follow-up 10/16/2019 and was noted to be doing well. She was presented in conference and recommendations were made for referral to medical and radiation oncology which was the rationale for her consultation with Dr. Moraima Ashby on 10/25/2019. She was accompanied by her  and research was present. recommended a course of adjuvant radiation therapy for improved local control. Dr. Moraima Ashby offered her hypo-fractionated radiation employing 42.56 Gy to be delivered in 16 fractions followed by a 10 Gy boost to the lumpectomy bed. She also enrolled on Prelude, DCISion RT protocol.     Please see the details of her treatment below as well as my plans for future care and surveillance. Please do not hesitate to call with questions or concerns at any time.       DIAGNOSIS: Right breast DCIS, pTisNxMx, Stage 0, ER 94%, FL 83%     PREVIOUS TREATMENT:    1) 8/21/19:  Right breast biopsy  2) 10/9/19:  Right breast lumpectomy    TREATMENT DATES:    1) Right breast: 11/12/2019 - 12/5/2019  2) Right breast boost: 12/6/2019 - 12/11/2019    ANATOMIC SITE:  Right breast    DOSE:    1) Right breast: 4256 cGy in 16 fractions  2) Right breast boost: 1000 cGy in 4 fractions    BEAM ARRANGEMENT:    1) Right breast: 3D Conformal - 6MV  2) Right breast boost: 3D Conformal - 12E    CHEMOTHERAPY: None    TREATMENT COURSE: Nikki Heck did well without complaints week 1-2, other than fatigue that developed and hot flashes that worsened on week 2. There were no issues with week 3 and 4. PLAN:  The patient will be seen in follow up in 4 weeks to assess acute and sub acute side effects.       VIRI Schroeder MD

## 2020-01-15 ENCOUNTER — HOSPITAL ENCOUNTER (OUTPATIENT)
Dept: RADIATION ONCOLOGY | Age: 50
Discharge: HOME | End: 2020-01-15
Payer: COMMERCIAL

## 2020-01-15 VITALS
TEMPERATURE: 98.1 F | OXYGEN SATURATION: 100 % | BODY MASS INDEX: 22.06 KG/M2 | HEART RATE: 71 BPM | WEIGHT: 134.6 LBS | DIASTOLIC BLOOD PRESSURE: 73 MMHG | SYSTOLIC BLOOD PRESSURE: 107 MMHG

## 2020-01-15 DIAGNOSIS — D05.11 DUCTAL CARCINOMA IN SITU (DCIS) OF RIGHT BREAST: Primary | ICD-10-CM

## 2020-01-15 PROCEDURE — 99211 OFF/OP EST MAY X REQ PHY/QHP: CPT

## 2020-01-15 NOTE — PROGRESS NOTES
Patient: Sharon Blum MRN: [id number]  SSN: [id number]    YOB: 1970  Age: 47 y.o. Sex: female      Other Providers:  Patricia Alfaro MD    CHIEF COMPLAINT: Breast cancer    DIAGNOSIS: Right breast DCIS, pTisNxMx, Stage 0, ER 94%, VA 83%    PREVIOUS TREATMENT:  1) 8/21/19:  Right breast biopsy  2) 10/9/19:  Right breast lumpectomy  3) Adjuvant radiation to right breast. Dose: Right breast: 4256 cGy in 16 fractions, Right breast boost: 1000 cGy in 4 fractions. Treatment dates: 11/12/2019 - 12/11/2019. HISTORY OF PRESENT ILLNESS:  Sharon Blum is a 47 y.o. female initially seen by Dr. Samanta Sneed at the request of Dr. Dez Lau.  She originally presented for routine mammography where an abnormality with calcifications was noted in the right breas at the right central 9:00 position. Her 3-D mammography was completed 8/15/2019 and subsequent biopsy 8/15/19 confirmed ductal carcinoma in situ, ER 94%, VA 83%. Staging MRI showed a degree of enhancement measuring 4 cm around the initial biopsy site. Surgical options were discussed and ultimately she elected to proceed with breast conserving surgery. She completed right breast lumpectomy 10/9/19. Final pathology revealed no residual ductal carcinoma in situ    She was seen back in follow-up 10/16/2019 and was noted to be doing well. She was presented in conference and recommendations were made for referral to medical and radiation oncology which was the rationale for our consultation 10/25/2019. She was accompanied by her  and research was present.         TREATMENT COURSE: Sharon Blum did well without complaints week 1-2, other than fatigue that developed and hot flashes that worsened on week 2.  There were no issues with week 3 and 4. INTERVAL HISTORY:    01/15/20: Ms Josias Mancia returns 1 month out from completion of radiation therapy. At this time, Ms Josias Mancia is doing very well.  She reports mild itching around the nipple, otherwise no breast complaints. Good appetite. No swallowing issues. Energy continues to improve. Overall doing well. OBSTETRIC HISTORY:    Menarche at the age of 14.    G4,P3. Oral Contraceptive Pills:  Yes  Hormone Replacement Therapy:  None    PAST MEDICAL HISTORY:    Past Medical History:   Diagnosis Date    Breast cancer ([facility] [address] 09/2019    Rt breast    [facility]     right breast DCIS    Ductal carcinoma in situ of breast 09/2019    Rt breast       The patient denies history of collagen vascular diseases, pacemaker insertion, prior radiation or prior chemotherapy. PAST SURGICAL HISTORY:   Past Surgical History:   Procedure Laterality Date    HX BREAST BIOPSY Right     DCIS    HX BREAST BIOPSY Right 10/9/2019    RIGHT BREAST NEEDLE LOCALIZED BIOPSY  PREOP 7:30/ LOC 8:30/ SURGERY 10:30 performed by Nicolas Sultana DO at [address] HX COLONOSCOPY      x2    HX HEENT  2009    lasix     HX MASTECTOMY Right 10/9/2019    RIGHT PARTIAL BREAST MASTECTOMY performed by Nicolas Sultana DO at [address] HX ORTHOPAEDIC Right 2007    knee       MEDICATIONS:   No current outpatient medications on file.     ALLERGIES:   No Known Allergies    SOCIAL HISTORY:   Social History     Socioeconomic History    Marital status:      Spouse name: Not on file    Number of children: Not on file    Years of education: Not on file    Highest education level: Not on file   Occupational History    Not on file   Social Needs    Financial resource strain: Not on file    Food insecurity:     Worry: Not on file     Inability: Not on file    Transportation needs:     Medical: Not on file     Non-medical: Not on file   Tobacco Use    Smoking status: Never Smoker    Smokeless tobacco: Never Used   Substance and Sexual Activity    Alcohol use: Yes     Comment: ocass    Drug use: Never    Sexual activity: Not on file   Lifestyle    Physical activity:     Days per week: Not on file     Minutes per session: Not on file    Stress: Not on file   Relationships    Social connections:     Talks on phone: Not on file     Gets together: Not on file     Attends Nondenominational service: Not on file     Active member of club or organization: Not on file     Attends meetings of clubs or organizations: Not on file     Relationship status: Not on file    Intimate partner violence:     Fear of current or ex partner: Not on file     Emotionally abused: Not on file     Physically abused: Not on file     Forced sexual activity: Not on file   Other Topics Concern     Service Not Asked    Blood Transfusions Not Asked    Caffeine Concern Not Asked    Occupational Exposure Not Asked   Debe Martinez Hazards Not Asked    Sleep Concern Not Asked    Stress Concern Not Asked    Weight Concern Not Asked    Special Diet Not Asked    Back Care Not Asked    Exercise Not Asked    Bike Helmet Not Asked   [address] Not Asked    Self-Exams Not Asked   Social History Narrative    Not on file       FAMILY HISTORY:   Family History   Problem Relation Age of Onset    Cancer Maternal Grandfather         colon    Breast Cancer Neg Hx        REVIEW OF SYSTEMS: Please see the completed review of systems sheet in the chart that I have reviewed today. PHYSICAL EXAMINATION:   ECOG Performance status 0  VITAL SIGNS: blood pressure 107/73   Pulse 71  Temperature 9.1  Weight 134.6    GENERAL: The patient is well-developed, ambulatory, alert and in no acute distress. LYMPHATIC: There is no cervical, supraclavicular or axillary lymphadenopathy. BREASTS: Examination of the lumpectomy cavity appears well healed with good aesthetics and symmetry. Well healed surgical incision. PATHOLOGY:    8/21/19:     DIAGNOSIS   A: \"RIGHT BREAST CALCIFICATIONS AT 9:00 POSITION, CORE BIOPSY\":   DUCTAL CARCINOMA IN SITU, LOW-GRADE (CRIBRIFORM TYPE) WITH ASSOCIATED MICROCALCIFICATIONS   will/8/22/2019   Electronically signed out on 8/22/2019 06:28 by KIMBERLY Ortega, Abel Mosquera M.D. Procedures/Addenda   STF-IMMUNOHISTOCHEMISTRY   STF- ER/MN/OXO8BUC BY IHC   Status: Signed Out Lizeth Newton MD on 8/26/2019   Interpretation   [facility] IHC Quantitative Breast Panel Result:   TEST NAME: RESULTS: INTERNAL CONTROLS:   ESTROGEN RECEPTOR: Positive (94%) Present, positive   PROGESTERONE RECEPTOR: Positive (83%) Present, positive     10-9/19:      DIAGNOSIS   RIGHT BREAST PARTIAL MASTECTOMY: CHANGES CONSISTENT WITH PRIOR BIOPSY SITE; FOCAL ATYPICAL DUCTAL HYPERPLASIA, MARGINS UNINVOLVED; NO RESIDUAL DUCTAL CARCINOMA IN SITU IDENTIFIED. LABORATORY:   Lab Results   Component Value Date/Time    WBC 8.9 01/14/2009 07:40 AM    HGB 13.5 01/14/2009 07:40 AM    HCT 38.5 01/14/2009 07:40 AM    PLATELET 245 05/25/0355 07:40 AM       RADIOLOGY:    Mri Breast Bi W Wo Cont    Result Date: 8/26/2019  BREAST MRI BEFORE AND AFTER CONTRAST CLINICAL HISTORY:  Follow-up recent needle biopsy diagnosis of right breast low-grade DCIS; for preoperative evaluation. TECHNIQUE: Standard axial and sagittal images were obtained before and during bolus injection of 15 cc of Dotarem IV. CAD was employed. COMPARISON:  Multiple prior studies including 3-D screening mammogram of July 31, 2013, right additional views of August 15, 2019, and right stereotactic biopsy of August 21, 2019. FINDINGS: There is extensive background parenchymal enhancement bilaterally. There is reticulonodular enhancement extending linearly in a radial configuration posteriorly and slightly superiorly from the right central 9:00 biopsy marker clip which is suspicious for extension of the DCIS. Review of recent 3-D screening mammogram and right additional views with magnification suggests possible associated microcalcifications. Note dominant suspicious enhancement is seen in the left breast.  No pathologically enlarged or dysmorphic lymph nodes are seen. The liver, lungs, and chest wall appear unremarkable as imaged.      IMPRESSION: 1. RETICULONODULAR ENHANCEMENT EXTENDING APPROXIMATELY 4 CM POSTERIORLY FROM THE RIGHT CENTRAL 9:00 BIOPSY MARKER CLIP IS SUSPICIOUS FOR EXTENSION OF DCIS. THERE ARE POSSIBLE ASSOCIATED MICROCALCIFICATIONS, AND THEREFORE  CRANIOCAUDAL, STRAIGHT LATERAL, AND MAGNIFICATION SPOT COMPRESSION VIEWS ARE RECOMMENDED EITHER BEFORE OR AT THE TIME OF ANTICIPATED PREOPERATIVE MAMMOGRAPHIC WIRE LOCALIZATION. 2.  EXTENSIVE BILATERAL BREAST PARENCHYMAL ENHANCEMENT LIMITS MRI EVALUATION FOR POSSIBLE SMALL FOCI OF TUMOR, AND THEREFORE FOLLOW-UP MRI IS RECOMMENDED IN ONE YEAR. Right: BI-RADS Assessment Category 6: Known Biopsy Proven Malignancy. Bilateral parenchyma: BI-RADS Assessment Category 3: Probably benign- Short-interval follow-up suggested (1 year). [facility] Mammo Rt Dx Incl Cad    Result Date: 8/15/2019  DIAGNOSTIC DIGITAL right MAMMOGRAPHY CLINICAL INDICATION: Additional evaluation of right lateral calcifications on new baseline screening with no personal malignancy or breast surgery, no family breast cancer COMPARISON: 7/31/2019 FINDINGS: Mammogram: Digital diagnostic mammography was performed, and is interpreted in conjunction with a computer assisted detection (CAD) system. Additional right mediolateral and magnification views were performed. In the lateral breast at 9:30 position middle depth there is a small subcentimeter grouping of faint calcifications which are pleomorphic, some but not all demonstrating layering, and therefore indeterminate. Biopsy is warranted. Images were reviewed in consultation with colleagues, who agreed. Elsewhere in the right breast there are minimal scattered typically benign calcifications, with no concerning grouping. There is no evidence of a mass, distortion, or skin thickening. IMPRESSION: Right 9:30 indeterminate calcifications. Recommend stereotactic biopsy. This was reported to the patient at the time of interpretation.  BI-RADS Assessment Category 4: Suspicious Finding- Biopsy should be considered. [place] Breast Rt Surg Spec    Result Date: 10/9/2019  Mammographic guided needle localization Surgical specimen radiograph CLINICAL INDICATION: Right 9:00 breast cancer undergoing surgical excision, with recent MRI demonstrating reticular nonmass enhancement posterior to the biopsy clip. COMPARISON: MRI 8/26/2019, mammography 20 1/20/2019 and 8/15/2019 PROCEDURE: After discussing the risks and benefits, including but not limited to bleeding and infection, written and verbal informed consent were obtained. The overlying skin was prepped in sterile fashion. Total of 6 mL of 1% Lidocaine was used for local anesthesia. Under mammographic guidance, a 5 cm [place] wire was placed adjacent to the clip. A 7 cm homer wire was then placed adjacent to the expected posterior aspect of the MRI enhancement. Postprocedural mammographic films were then obtained and marked for the surgeon. The location of the expected enhancement was discussed in person with the surgeon in the reading room at 10:35 AM. The patient tolerated the procedure well without immediate complications and was transported to the operating room. IMPRESSION: Successful mammographic-guided needle localization of right 9:00 breast cancer. Pathology results are pending. Submitted specimen radiograph demonstrates intact localization wires and the biopsy clip. This was reported to Dr. Willard Ugarte in the operating room at the time of acquisition. [facility] Breast Rt Surg Spec    Result Date: 8/21/2019  RIGHT BREAST STEREOTACTIC BIOPSY, RIGHT BREAST SURGICAL SPECIMEN AND RIGHT BREAST POST BIOPSY MAMMOGRAM, 8/21/2019. CLINICAL HISTORY: Right breast calcifications. PROCEDURE: RIGHT BREAST STEREOTACTIC BIOPSY: After obtaining written informed consent, the patient was placed in a prone position on the stereotactic biopsy table.  Preliminary imaging demonstrates the calcifications in the outer soft tissues of the right breast. The right breast was placed in mediolateral compression and a lateral approach was used. The right breast was prepped and draped in the usual sterile fashion. 1% Lidocaine was used for local anesthesia. Using stereotactic guidance, an 9 gauge Eviva needle was positioned adjacent to the calcifications. Four, vacuum assisted core biopsy samples were obtained from about the periphery of the needle with calcifications confirmed on specimen radiograph detailed below. A biopsy clip was placed. The biopsy apparatus was removed and hemostasis was achieved. No procedure, or immediate postprocedure complications were noted. RIGHT BREAST SPECIMEN RADIOGRAPH: Magnification radiographs of the breast surgical specimen demonstrate calcifications in the sample(s) obtained. The samples containing calcifications were placed in a container of formalin labeled \"A. \"  The samples were sent to histology for further evaluation. POST BIOPSY MAMMOGRAPH: Standard views of the right breast were obtained. These show biopsy changes in the outer soft tissues of the right breast.  There has been successful placement of the biopsy clip which closely approximates the abnormal calcifications as seen on pre-biopsy imaging. No significant post biopsy hematoma is seen. IMPRESSION: 1. Successful right breast stereotactic guided biopsy with histology results pending. [facility] Post Bx Imaging Rt Incl Cad    Result Date: 8/21/2019  RIGHT BREAST STEREOTACTIC BIOPSY, RIGHT BREAST SURGICAL SPECIMEN AND RIGHT BREAST POST BIOPSY MAMMOGRAM, 8/21/2019. CLINICAL HISTORY: Right breast calcifications. PROCEDURE: RIGHT BREAST STEREOTACTIC BIOPSY: After obtaining written informed consent, the patient was placed in a prone position on the stereotactic biopsy table. Preliminary imaging demonstrates the calcifications in the outer soft tissues of the right breast. The right breast was placed in mediolateral compression and a lateral approach was used.   The right breast was prepped and draped in the usual sterile fashion. 1% Lidocaine was used for local anesthesia. Using stereotactic guidance, an 9 gauge Eviva needle was positioned adjacent to the calcifications. Four, vacuum assisted core biopsy samples were obtained from about the periphery of the needle with calcifications confirmed on specimen radiograph detailed below. A biopsy clip was placed. The biopsy apparatus was removed and hemostasis was achieved. No procedure, or immediate postprocedure complications were noted. RIGHT BREAST SPECIMEN RADIOGRAPH: Magnification radiographs of the breast surgical specimen demonstrate calcifications in the sample(s) obtained. The samples containing calcifications were placed in a container of formalin labeled \"A. \"  The samples were sent to histology for further evaluation. POST BIOPSY MAMMOGRAPH: Standard views of the right breast were obtained. These show biopsy changes in the outer soft tissues of the right breast.  There has been successful placement of the biopsy clip which closely approximates the abnormal calcifications as seen on pre-biopsy imaging. No significant post biopsy hematoma is seen. IMPRESSION: 1. Successful right breast stereotactic guided biopsy with histology results pending. [facility] Stereo Vac  Bx Breast Rt 1st Lesion W/clip And Specimen    Addendum Date: 8/27/2019    Addendum: Radhika Ray, accession number: [id number] Date: 8/23/2019 Pathology was noted as A: Right breast calcifications at 9:00 position, core biopsy: Ductal carcinoma in situ, low grade (cribriform type) with associated microcalcifications. Results concordant with imaging. Pathology report was called to  Pari Schumacher NP's office on 8/22/2019 by DENNY Smith. Patient is to follow up with Pari Schumacher NP for surgical referral.     Result Date: 8/27/2019  RIGHT BREAST STEREOTACTIC BIOPSY, RIGHT BREAST SURGICAL SPECIMEN AND RIGHT BREAST POST BIOPSY MAMMOGRAM, 8/21/2019.  CLINICAL HISTORY: Right breast calcifications. PROCEDURE: RIGHT BREAST STEREOTACTIC BIOPSY: After obtaining written informed consent, the patient was placed in a prone position on the stereotactic biopsy table. Preliminary imaging demonstrates the calcifications in the outer soft tissues of the right breast. The right breast was placed in mediolateral compression and a lateral approach was used. The right breast was prepped and draped in the usual sterile fashion. 1% Lidocaine was used for local anesthesia. Using stereotactic guidance, an 9 gauge Eviva needle was positioned adjacent to the calcifications. Four, vacuum assisted core biopsy samples were obtained from about the periphery of the needle with calcifications confirmed on specimen radiograph detailed below. A biopsy clip was placed. The biopsy apparatus was removed and hemostasis was achieved. No procedure, or immediate postprocedure complications were noted. RIGHT BREAST SPECIMEN RADIOGRAPH: Magnification radiographs of the breast surgical specimen demonstrate calcifications in the sample(s) obtained. The samples containing calcifications were placed in a container of formalin labeled \"A. \"  The samples were sent to histology for further evaluation. POST BIOPSY MAMMOGRAPH: Standard views of the right breast were obtained. These show biopsy changes in the outer soft tissues of the right breast.  There has been successful placement of the biopsy clip which closely approximates the abnormal calcifications as seen on pre-biopsy imaging. No significant post biopsy hematoma is seen. IMPRESSION: 1. Successful right breast stereotactic guided biopsy with histology results pending. [facility] Ndl/wire/clip/seed Breast Rt    Result Date: 10/9/2019  Mammographic guided needle localization Surgical specimen radiograph CLINICAL INDICATION: Right 9:00 breast cancer undergoing surgical excision, with recent MRI demonstrating reticular nonmass enhancement posterior to the biopsy clip. COMPARISON: MRI 8/26/2019, mammography 20 1/20/2019 and 8/15/2019 PROCEDURE: After discussing the risks and benefits, including but not limited to bleeding and infection, written and verbal informed consent were obtained. The overlying skin was prepped in sterile fashion. Total of 6 mL of 1% Lidocaine was used for local anesthesia. Under mammographic guidance, a 5 cm [place] wire was placed adjacent to the clip. A 7 cm homer wire was then placed adjacent to the expected posterior aspect of the MRI enhancement. Postprocedural mammographic films were then obtained and marked for the surgeon. The location of the expected enhancement was discussed in person with the surgeon in the reading room at 10:35 AM. The patient tolerated the procedure well without immediate complications and was transported to the operating room. IMPRESSION: Successful mammographic-guided needle localization of right 9:00 breast cancer. Pathology results are pending. Submitted specimen radiograph demonstrates intact localization wires and the biopsy clip. This was reported to Dr. Nicole Delgadillo in the operating room at the time of acquisition. [facility] 3d Huy W Mammo Bi Screening Incl Cad    Result Date: 8/13/2019  STUDY:  Bilateral digital screening mammogram with CAD and Tomosynthesis 7/31/2019 INDICATION:  Routine screening mammogram. COMPARISON:  Bilateral mammograms 2/20/2013. Additional prior studies may have been reviewed, as needed, for completion of this report. FINDINGS: Bilateral digital screening mammography, interpreted in conjunction with CAD overread. The breasts are dense which decreases the sensitivity of mammography. The axilla contain benign appearing lymph nodes. No evidence of evolving skin thickening, or nipple retraction is seen. A new small cluster of calcifications is seen in the outer, mid right breast which should be further characterized.  An additional new lucent center calcification is seen in the medial right breast although this is felt to have a typically benign appearance and not felt to be worrisome. No evolving dominant mass, spiculated density, or architectural distortion is seen. Bilateral breast tomosynthesis was performed, and is interpreted in conjunction with a computer assisted detection (CAD) system. The prior small cluster of calcifications in the right outer right breast is once again demonstrated and cannot be confirmed to be benign. No associated worrisome asymmetry or architectural changes is suggested. Otherwise, no suspicious masses, calcifications, or architectural distortion are identified otherwise. IMPRESSION:  1.  New small cluster of calcifications in the outer, mid right breast.  Further evaluation with spot magnification diagnostic mammogram is recommended. We are mailing breast density information to the patient along with the mammogram report. A reminder letter will be scheduled at the appropriate time pending additional views. BI-RADS Assessment Category 0: Incomplete: Needs additional imaging evaluation. BD4       IMPRESSION: Keke Mccabe is a 47 y.o. female with Right breast DCIS, pTisNxMx, Stage 0, ER 94%, AK 83%. She was treated with adjuvant radiation therapy 11/12/2019 - 12/11/2019. Ms Jakub Hanson is now 1 month out from completion of adjuvant radiation to right breast. She is recovering as anticipated. PLAN:  1) We discussed side effects related to radiation and ongoing surveillance for her breast cancer s/p radiation therapy to include follow up every 6 months 1-2 years. All questions were answered to the best of my ability. 2) Schedule 3 D - mammogram for August 2020. She will be seen in radiation oncology shortly  there after. 3) Medical oncology as scheduled   4) Continue to lotion. Continue SBE  5) I spent 25 minutes in the care of Ms Jakub Hanson today, over 50% of which was in direct counseling and ongoing management of her right breast DCIS.  All questions were answered to the best of my ability. Marilynn Ridley NP  01/15/20       Portions of this note were copied from prior encounters and reviewed for accuracy, currency, and represent documentation and tasks completed during this encounter. I verify and attest these portions to be unchanged from prior visits.

## 2020-01-15 NOTE — NURSE NAVIGATOR
F/u right breast DCIS. Lumpectomy 10-9-19. RT end 12-11-19. F/u Dr. Caitlin De Jesus 2-10-20. Pt c/o nipple itching and treatment area discoloration.     [place] Connor RN

## 2020-08-10 ENCOUNTER — HOSPITAL ENCOUNTER (OUTPATIENT)
Dept: MAMMOGRAPHY | Age: 50
Discharge: HOME | End: 2020-08-10
Attending: NURSE PRACTITIONER
Payer: COMMERCIAL

## 2020-08-10 DIAGNOSIS — D05.11 DUCTAL CARCINOMA IN SITU (DCIS) OF RIGHT BREAST: ICD-10-CM

## 2020-08-10 PROCEDURE — 77063 BREAST TOMOSYNTHESIS BI: CPT

## 2020-08-13 DIAGNOSIS — D05.11 DUCTAL CARCINOMA IN SITU (DCIS) OF RIGHT BREAST: Primary | ICD-10-CM

## 2020-08-19 ENCOUNTER — APPOINTMENT (RX ONLY)
Dept: URBAN - METROPOLITAN AREA CLINIC 24 | Facility: CLINIC | Age: 50
Setting detail: DERMATOLOGY
End: 2020-08-19

## 2020-08-19 DIAGNOSIS — L81.8 OTHER SPECIFIED DISORDERS OF PIGMENTATION: ICD-10-CM

## 2020-08-19 DIAGNOSIS — L81.4 OTHER MELANIN HYPERPIGMENTATION: ICD-10-CM

## 2020-08-19 DIAGNOSIS — L82.1 OTHER SEBORRHEIC KERATOSIS: ICD-10-CM

## 2020-08-19 DIAGNOSIS — Z71.89 OTHER SPECIFIED COUNSELING: ICD-10-CM

## 2020-08-19 DIAGNOSIS — L57.0 ACTINIC KERATOSIS: ICD-10-CM

## 2020-08-19 DIAGNOSIS — Z85.828 PERSONAL HISTORY OF OTHER MALIGNANT NEOPLASM OF SKIN: ICD-10-CM

## 2020-08-19 DIAGNOSIS — D22 MELANOCYTIC NEVI: ICD-10-CM

## 2020-08-19 PROBLEM — D22.62 MELANOCYTIC NEVI OF LEFT UPPER LIMB, INCLUDING SHOULDER: Status: ACTIVE | Noted: 2020-08-19

## 2020-08-19 PROBLEM — D22.0 MELANOCYTIC NEVI OF LIP: Status: ACTIVE | Noted: 2020-08-19

## 2020-08-19 PROBLEM — D22.72 MELANOCYTIC NEVI OF LEFT LOWER LIMB, INCLUDING HIP: Status: ACTIVE | Noted: 2020-08-19

## 2020-08-19 PROBLEM — D23.71 OTHER BENIGN NEOPLASM OF SKIN OF RIGHT LOWER LIMB, INCLUDING HIP: Status: ACTIVE | Noted: 2020-08-19

## 2020-08-19 PROCEDURE — ? COUNSELING

## 2020-08-19 PROCEDURE — 99214 OFFICE O/P EST MOD 30 MIN: CPT | Mod: 25

## 2020-08-19 PROCEDURE — 17000 DESTRUCT PREMALG LESION: CPT

## 2020-08-19 PROCEDURE — ? LIQUID NITROGEN

## 2020-08-19 ASSESSMENT — LOCATION DETAILED DESCRIPTION DERM
LOCATION DETAILED: LEFT MEDIAL SUPERIOR CHEST
LOCATION DETAILED: RIGHT LATERAL DORSAL FOOT
LOCATION DETAILED: RIGHT RIB CAGE
LOCATION DETAILED: RIGHT SUPERIOR HELIX
LOCATION DETAILED: RIGHT PROXIMAL POSTERIOR UPPER ARM
LOCATION DETAILED: RIGHT UPPER CUTANEOUS LIP
LOCATION DETAILED: EPIGASTRIC SKIN
LOCATION DETAILED: LEFT INFERIOR LATERAL FOREHEAD
LOCATION DETAILED: LEFT SUPERIOR MEDIAL UPPER BACK
LOCATION DETAILED: LEFT POPLITEAL SKIN
LOCATION DETAILED: LEFT RIB CAGE
LOCATION DETAILED: LEFT POSTERIOR SHOULDER
LOCATION DETAILED: RIGHT DISTAL DORSAL FOREARM
LOCATION DETAILED: LEFT PROXIMAL POSTERIOR UPPER ARM

## 2020-08-19 ASSESSMENT — LOCATION ZONE DERM
LOCATION ZONE: LEG
LOCATION ZONE: FEET
LOCATION ZONE: ARM
LOCATION ZONE: FACE
LOCATION ZONE: LIP
LOCATION ZONE: EAR
LOCATION ZONE: TRUNK

## 2020-08-19 ASSESSMENT — LOCATION SIMPLE DESCRIPTION DERM
LOCATION SIMPLE: ABDOMEN
LOCATION SIMPLE: RIGHT EAR
LOCATION SIMPLE: LEFT SHOULDER
LOCATION SIMPLE: LEFT UPPER BACK
LOCATION SIMPLE: CHEST
LOCATION SIMPLE: LEFT POPLITEAL SKIN
LOCATION SIMPLE: LEFT POSTERIOR UPPER ARM
LOCATION SIMPLE: RIGHT POSTERIOR UPPER ARM
LOCATION SIMPLE: LEFT FOREHEAD
LOCATION SIMPLE: RIGHT LIP
LOCATION SIMPLE: RIGHT FOOT
LOCATION SIMPLE: RIGHT FOREARM

## 2020-08-19 NOTE — PROCEDURE: LIQUID NITROGEN
Render Note In Bullet Format When Appropriate: No
Number Of Freeze-Thaw Cycles: 1 freeze-thaw cycle
Post-Care Instructions: I reviewed with the patient in detail post-care instructions. Patient is to wear sunprotection, and avoid picking at any of the treated lesions. Pt may apply Vaseline to crusted or scabbing areas.
Render Post-Care Instructions In Note?: yes
Duration Of Freeze Thaw-Cycle (Seconds): 0
Detail Level: Detailed
Consent: The patient's consent was obtained including but not limited to risks of crusting, scabbing, blistering, scarring, darker or lighter pigmentary change, recurrence, incomplete removal and infection.

## 2020-08-19 NOTE — HPI: FULL BODY SKIN EXAMINATION
What Type Of Note Output Would You Prefer (Optional)?: Standard Output
What Is The Reason For Today's Visit?: Full Body Skin Examination
What Is The Reason For Today's Visit? (Being Monitored For X): concerning skin lesions on an annual basis
How Severe Are Your Spot(S)?: mild
Additional History: Pt gives verbal consent to biopsy.Amisha

## 2021-02-18 ENCOUNTER — HOSPITAL ENCOUNTER (OUTPATIENT)
Dept: RADIATION ONCOLOGY | Age: 51
Discharge: HOME | End: 2021-02-18
Payer: COMMERCIAL

## 2021-02-18 VITALS
RESPIRATION RATE: 18 BRPM | WEIGHT: 140.9 LBS | DIASTOLIC BLOOD PRESSURE: 69 MMHG | SYSTOLIC BLOOD PRESSURE: 102 MMHG | BODY MASS INDEX: 23.81 KG/M2 | TEMPERATURE: 96.8 F | HEART RATE: 70 BPM | OXYGEN SATURATION: 99 %

## 2021-02-18 PROCEDURE — 99211 OFF/OP EST MAY X REQ PHY/QHP: CPT

## 2021-02-18 NOTE — PROGRESS NOTES
Documentation:    DIAGNOSIS: Right breast DCIS, pTisNxMx, Stage 0, ER 94%, OH 83%     PREVIOUS TREATMENT:  1) 8/21/19:  Right breast biopsy  2) 10/9/19:  Right breast lumpectomy  3) Adjuvant radiation to right breast. Dose: Right breast: 4256 cGy in 16 fractions, Right breast boost: 1000 cGy in 4 fractions. Treatment dates: 11/12/2019 - 12/11/2019.   4) Declined endocrine therapy     HISTORY OF PRESENT ILLNESS:  Sarmad Ochoa is a 47 y.o. female initially seen by Dr. Cait Torres at the request of Dr. Maria Victoria Price.  She originally presented for routine mammography where an abnormality with calcifications was noted in the right breas at the right central 9:00 position. Her 3-D mammography was completed 8/15/2019 and subsequent biopsy 8/15/19 confirmed ductal carcinoma in situ, ER 94%, OH 83%. Staging MRI showed a degree of enhancement measuring 4 cm around the initial biopsy site. Surgical options were discussed and ultimately she elected to proceed with breast conserving surgery. She completed right breast lumpectomy 10/9/19. Final pathology revealed no residual ductal carcinoma in situ She was seen back in follow-up 10/16/2019 and was noted to be doing well. She was presented in conference and recommendations were made for referral to medical and radiation oncology which was the rationale for our consultation 10/25/2019. She was accompanied by her  and research was present.          TREATMENT COURSE: Nikki Heck did well without complaints week 1-2, other than fatigue that developed and hot flashes that worsened on week 2.  There were no issues with week 3 and 4.       INTERVAL HISTORY:     01/15/20: Ms Jesus Weathers returns 1 month out from completion of radiation therapy. At this time, Ms Jesus Weathers is doing very well. She reports mild itching around the nipple, otherwise no breast complaints. Good appetite. No swallowing issues. Energy continues to improve.  Overall doing well.       08/13/20: Virtual visit: Ms Jesus Weathers is now 8 months out from completing radiation therapy. At this time, Ms Jud Richmond is doing well. She reports no breast issues. Not consistent with SBE, but \"trying to be better. \" Good appetite. No swallowing complaints. Denies cough or shortness of breath. Remains active playing tennis, although she recently hurt her ankle so currently resting. Feels well. No new complaints. 2/18/21: Here today for follow up. Doing well since last seen. Feels back to normal after radiation therapy. No breast concerns or changes. No pain. No infectious symptoms. OBSTETRIC HISTORY:    Menarche at the age of 14.    G4,P3.     Oral Contraceptive Pills:  Yes  Hormone Replacement Therapy:  None     PAST MEDICAL HISTORY:         Past Medical History:   Diagnosis Date    Breast cancer ([facility] [address] 09/2019     Rt breast    Cancer ([facility] [address]       right breast DCIS    Ductal carcinoma in situ of breast 09/2019     Rt breast        The patient denies history of collagen vascular diseases, pacemaker insertion, prior radiation or prior chemotherapy.      PAST SURGICAL HISTORY:         Past Surgical History:   Procedure Laterality Date    HX BREAST BIOPSY Right       DCIS    HX BREAST BIOPSY Right 10/9/2019     RIGHT BREAST NEEDLE LOCALIZED BIOPSY  PREOP 7:30/ LOC 8:30/ SURGERY 10:30 performed by Paddy Jett DO at [address] HX COLONOSCOPY         x2    HX HEENT   2009     lasix     HX MASTECTOMY Right 10/9/2019     RIGHT PARTIAL BREAST MASTECTOMY performed by Paddy Jett DO at [address] HX ORTHOPAEDIC Right 2007     knee        MEDICATIONS:   No current outpatient medications on file.     ALLERGIES:   No Known Allergies     SOCIAL HISTORY:   Social History            Socioeconomic History    Marital status:        Spouse name: Not on file    Number of children: Not on file    Years of education: Not on file    Highest education level: Not on file   Occupational History    Not on file   Social Needs    Financial resource strain: Not on file    Food insecurity:       Worry: Not on file       Inability: Not on file    Transportation needs:       Medical: Not on file       Non-medical: Not on file   Tobacco Use    Smoking status: Never Smoker    Smokeless tobacco: Never Used   Substance and Sexual Activity    Alcohol use: Yes       Comment: ocass    Drug use: Never    Sexual activity: Not on file   Lifestyle    Physical activity:       Days per week: Not on file       Minutes per session: Not on file    Stress: Not on file   Relationships    Social connections:       Talks on phone: Not on file       Gets together: Not on file       Attends Voodoo service: Not on file       Active member of club or organization: Not on file       Attends meetings of clubs or organizations: Not on file       Relationship status: Not on file    Intimate partner violence:       Fear of current or ex partner: Not on file       Emotionally abused: Not on file       Physically abused: Not on file       Forced sexual activity: Not on file   Other Topics Concern     Service Not Asked    Blood Transfusions Not Asked    Caffeine Concern Not Asked    Occupational Exposure Not Asked    Hobby Hazards Not Asked    Sleep Concern Not Asked    Stress Concern Not Asked    Weight Concern Not Asked    Special Diet Not Asked    Back Care Not Asked    Exercise Not Asked    Bike Helmet Not Asked   [address] [facility] Not Asked    Self-Exams Not Asked   Social History Narrative    Not on file        FAMILY HISTORY:         Family History   Problem Relation Age of Onset    Cancer Maternal Grandfather           colon    Breast Cancer Neg Hx          REVIEW OF SYSTEMS: Please see interval history      PHYSICAL EXAMINATION:   ECOG Performance status 0  VITAL SIGNS: 96.8 102/69  RR18 HR 70 weight 140 lbs.      GENERAL: Appears well developed and well nourised. Alert and awake. Normal affect. Head: Normocephalic. No facial asymmetry. Respiratory effort normal. NO visualized signs of difficulty breathing or respiratory distress. Lungs: CTA bilaterally. Heart: regular rate and rhythm. Breast: normal breast exam, no concerning findings. Surgical changes noted.      PATHOLOGY:    8/21/19:     DIAGNOSIS   A: \"RIGHT BREAST CALCIFICATIONS AT 9:00 POSITION, CORE BIOPSY\":   DUCTAL CARCINOMA IN SITU, LOW-GRADE (CRIBRIFORM TYPE) WITH ASSOCIATED MICROCALCIFICATIONS   jtl/8/22/2019   Electronically signed out on 8/22/2019 06:28 by KIMBERLY Camp M.D. Procedures/Addenda   STF-IMMUNOHISTOCHEMISTRY   STF- ER/MI/[id number] BY IHC   Status: Signed Out Oliverio Jamison MD on 8/26/2019   Interpretation   [employer] IHC Quantitative Breast Panel Result:   TEST NAME: RESULTS: INTERNAL CONTROLS:   ESTROGEN RECEPTOR: Positive (94%) Present, positive   PROGESTERONE RECEPTOR: Positive (83%) Present, positive      10-9/19:      DIAGNOSIS   RIGHT BREAST PARTIAL MASTECTOMY: CHANGES CONSISTENT WITH PRIOR BIOPSY SITE; FOCAL ATYPICAL DUCTAL HYPERPLASIA, MARGINS UNINVOLVED; NO RESIDUAL DUCTAL CARCINOMA IN SITU IDENTIFIED.        LABORATORY:         Lab Results   Component Value Date/Time     WBC 8.9 01/14/2009 07:40 AM     HGB 13.5 01/14/2009 07:40 AM     HCT 38.5 01/14/2009 07:40 AM     PLATELET 217 77/68/2483 07:40 AM        RADIOLOGY:    Mri Breast Bi W Wo Cont     Result Date: 8/26/2019  BREAST MRI BEFORE AND AFTER CONTRAST CLINICAL HISTORY:  Follow-up recent needle biopsy diagnosis of right breast low-grade DCIS; for preoperative evaluation. TECHNIQUE: Standard axial and sagittal images were obtained before and during bolus injection of 15 cc of Dotarem IV. CAD was employed. COMPARISON:  Multiple prior studies including 3-D screening mammogram of July 31, 2013, right additional views of August 15, 2019, and right stereotactic biopsy of August 21, 2019. FINDINGS: There is extensive background parenchymal enhancement bilaterally.  There is reticulonodular enhancement extending linearly in a radial configuration posteriorly and slightly superiorly from the right central 9:00 biopsy marker clip which is suspicious for extension of the DCIS. Review of recent 3-D screening mammogram and right additional views with magnification suggests possible associated microcalcifications. Note dominant suspicious enhancement is seen in the left breast.  No pathologically enlarged or dysmorphic lymph nodes are seen. The liver, lungs, and chest wall appear unremarkable as imaged.      IMPRESSION:  1. RETICULONODULAR ENHANCEMENT EXTENDING APPROXIMATELY 4 CM POSTERIORLY FROM THE RIGHT CENTRAL 9:00 BIOPSY MARKER CLIP IS SUSPICIOUS FOR EXTENSION OF DCIS. THERE ARE POSSIBLE ASSOCIATED MICROCALCIFICATIONS, AND THEREFORE  CRANIOCAUDAL, STRAIGHT LATERAL, AND MAGNIFICATION SPOT COMPRESSION VIEWS ARE RECOMMENDED EITHER BEFORE OR AT THE TIME OF ANTICIPATED PREOPERATIVE MAMMOGRAPHIC WIRE LOCALIZATION. 2.  EXTENSIVE BILATERAL BREAST PARENCHYMAL ENHANCEMENT LIMITS MRI EVALUATION FOR POSSIBLE SMALL FOCI OF TUMOR, AND THEREFORE FOLLOW-UP MRI IS RECOMMENDED IN ONE YEAR. Right: BI-RADS Assessment Category 6: Known Biopsy Proven Malignancy. Bilateral parenchyma: BI-RADS Assessment Category 3: Probably benign- Short-interval follow-up suggested (1 year).     [facility] Mammo Rt Dx Incl Cad     Result Date: 8/15/2019  DIAGNOSTIC DIGITAL right MAMMOGRAPHY CLINICAL INDICATION: Additional evaluation of right lateral calcifications on new baseline screening with no personal malignancy or breast surgery, no family breast cancer COMPARISON: 7/31/2019 FINDINGS: Mammogram: Digital diagnostic mammography was performed, and is interpreted in conjunction with a computer assisted detection (CAD) system. Additional right mediolateral and magnification views were performed.  In the lateral breast at 9:30 position middle depth there is a small subcentimeter grouping of faint calcifications which are pleomorphic, some but not all demonstrating layering, and therefore indeterminate. Biopsy is warranted. Images were reviewed in consultation with colleagues, who agreed. Elsewhere in the right breast there are minimal scattered typically benign calcifications, with no concerning grouping. There is no evidence of a mass, distortion, or skin thickening.      IMPRESSION: Right 9:30 indeterminate calcifications. Recommend stereotactic biopsy. This was reported to the patient at the time of interpretation. BI-RADS Assessment Category 4: Suspicious Finding- Biopsy should be considered.      [facility] Breast Rt Surg Spec     Result Date: 10/9/2019  Mammographic guided needle localization Surgical specimen radiograph CLINICAL INDICATION: Right 9:00 breast cancer undergoing surgical excision, with recent MRI demonstrating reticular nonmass enhancement posterior to the biopsy clip. COMPARISON: MRI 8/26/2019, mammography 20 1/20/2019 and 8/15/2019 PROCEDURE: After discussing the risks and benefits, including but not limited to bleeding and infection, written and verbal informed consent were obtained. The overlying skin was prepped in sterile fashion. Total of 6 mL of 1% Lidocaine was used for local anesthesia. Under mammographic guidance, a 5 cm [place] wire was placed adjacent to the clip. A 7 cm homer wire was then placed adjacent to the expected posterior aspect of the MRI enhancement. Postprocedural mammographic films were then obtained and marked for the surgeon. The location of the expected enhancement was discussed in person with the surgeon in the reading room at 10:35 AM. The patient tolerated the procedure well without immediate complications and was transported to the operating room.      IMPRESSION: Successful mammographic-guided needle localization of right 9:00 breast cancer. Pathology results are pending. Submitted specimen radiograph demonstrates intact localization wires and the biopsy clip.  This was reported to Dr. Nanda Bermudez in the operating room at the time of acquisition.       [facility] Breast Rt Surg Spec     Result Date: 8/21/2019  RIGHT BREAST STEREOTACTIC BIOPSY, RIGHT BREAST SURGICAL SPECIMEN AND RIGHT BREAST POST BIOPSY MAMMOGRAM, 8/21/2019. CLINICAL HISTORY: Right breast calcifications. PROCEDURE: RIGHT BREAST STEREOTACTIC BIOPSY: After obtaining written informed consent, the patient was placed in a prone position on the stereotactic biopsy table. Preliminary imaging demonstrates the calcifications in the outer soft tissues of the right breast. The right breast was placed in mediolateral compression and a lateral approach was used. The right breast was prepped and draped in the usual sterile fashion. 1% Lidocaine was used for local anesthesia. Using stereotactic guidance, an 9 gauge Eviva needle was positioned adjacent to the calcifications. Four, vacuum assisted core biopsy samples were obtained from about the periphery of the needle with calcifications confirmed on specimen radiograph detailed below. A biopsy clip was placed. The biopsy apparatus was removed and hemostasis was achieved. No procedure, or immediate postprocedure complications were noted. RIGHT BREAST SPECIMEN RADIOGRAPH: Magnification radiographs of the breast surgical specimen demonstrate calcifications in the sample(s) obtained. The samples containing calcifications were placed in a container of formalin labeled \"A. \"  The samples were sent to histology for further evaluation. POST BIOPSY MAMMOGRAPH: Standard views of the right breast were obtained. These show biopsy changes in the outer soft tissues of the right breast.  There has been successful placement of the biopsy clip which closely approximates the abnormal calcifications as seen on pre-biopsy imaging. No significant post biopsy hematoma is seen.      IMPRESSION: 1.   Successful right breast stereotactic guided biopsy with histology results pending.      [facility] Post Bx Imaging Rt Incl Cad     Result Date: 8/21/2019  RIGHT BREAST STEREOTACTIC BIOPSY, RIGHT BREAST SURGICAL SPECIMEN AND RIGHT BREAST POST BIOPSY MAMMOGRAM, 8/21/2019. CLINICAL HISTORY: Right breast calcifications. PROCEDURE: RIGHT BREAST STEREOTACTIC BIOPSY: After obtaining written informed consent, the patient was placed in a prone position on the stereotactic biopsy table. Preliminary imaging demonstrates the calcifications in the outer soft tissues of the right breast. The right breast was placed in mediolateral compression and a lateral approach was used. The right breast was prepped and draped in the usual sterile fashion. 1% Lidocaine was used for local anesthesia. Using stereotactic guidance, an 9 gauge Eviva needle was positioned adjacent to the calcifications. Four, vacuum assisted core biopsy samples were obtained from about the periphery of the needle with calcifications confirmed on specimen radiograph detailed below. A biopsy clip was placed. The biopsy apparatus was removed and hemostasis was achieved. No procedure, or immediate postprocedure complications were noted. RIGHT BREAST SPECIMEN RADIOGRAPH: Magnification radiographs of the breast surgical specimen demonstrate calcifications in the sample(s) obtained. The samples containing calcifications were placed in a container of formalin labeled \"A. \"  The samples were sent to histology for further evaluation. POST BIOPSY MAMMOGRAPH: Standard views of the right breast were obtained. These show biopsy changes in the outer soft tissues of the right breast.  There has been successful placement of the biopsy clip which closely approximates the abnormal calcifications as seen on pre-biopsy imaging. No significant post biopsy hematoma is seen.      IMPRESSION: 1.   Successful right breast stereotactic guided biopsy with histology results pending.      [facility] Stereo Vac  Bx Breast Rt 1st Lesion W/clip And Specimen     Addendum Date: 8/27/2019    Addendum: Osei Pierson, accession number: [id number] Date: 8/23/2019 Pathology was noted as A: Right breast calcifications at 9:00 position, core biopsy: Ductal carcinoma in situ, low grade (cribriform type) with associated microcalcifications. Results concordant with imaging. Pathology report was called to  Noy Alarcon NP's office on 8/22/2019 by DENNY Blelo. Patient is to follow up with Noy Alarcon NP for surgical referral.      Result Date: 8/27/2019  RIGHT BREAST STEREOTACTIC BIOPSY, RIGHT BREAST SURGICAL SPECIMEN AND RIGHT BREAST POST BIOPSY MAMMOGRAM, 8/21/2019. CLINICAL HISTORY: Right breast calcifications. PROCEDURE: RIGHT BREAST STEREOTACTIC BIOPSY: After obtaining written informed consent, the patient was placed in a prone position on the stereotactic biopsy table. Preliminary imaging demonstrates the calcifications in the outer soft tissues of the right breast. The right breast was placed in mediolateral compression and a lateral approach was used. The right breast was prepped and draped in the usual sterile fashion. 1% Lidocaine was used for local anesthesia. Using stereotactic guidance, an 9 gauge Eviva needle was positioned adjacent to the calcifications. Four, vacuum assisted core biopsy samples were obtained from about the periphery of the needle with calcifications confirmed on specimen radiograph detailed below. A biopsy clip was placed. The biopsy apparatus was removed and hemostasis was achieved. No procedure, or immediate postprocedure complications were noted. RIGHT BREAST SPECIMEN RADIOGRAPH: Magnification radiographs of the breast surgical specimen demonstrate calcifications in the sample(s) obtained. The samples containing calcifications were placed in a container of formalin labeled \"A. \"  The samples were sent to histology for further evaluation. POST BIOPSY MAMMOGRAPH: Standard views of the right breast were obtained.   These show biopsy changes in the outer soft tissues of the right breast.  There has been successful placement of the biopsy clip which closely approximates the abnormal calcifications as seen on pre-biopsy imaging. No significant post biopsy hematoma is seen.      IMPRESSION: 1. Successful right breast stereotactic guided biopsy with histology results pending.      [facility] Ndl/wire/clip/seed Breast Rt     Result Date: 10/9/2019  Mammographic guided needle localization Surgical specimen radiograph CLINICAL INDICATION: Right 9:00 breast cancer undergoing surgical excision, with recent MRI demonstrating reticular nonmass enhancement posterior to the biopsy clip. COMPARISON: MRI 8/26/2019, mammography 20 1/20/2019 and 8/15/2019 PROCEDURE: After discussing the risks and benefits, including but not limited to bleeding and infection, written and verbal informed consent were obtained. The overlying skin was prepped in sterile fashion. Total of 6 mL of 1% Lidocaine was used for local anesthesia. Under mammographic guidance, a 5 cm [place] wire was placed adjacent to the clip. A 7 cm homer wire was then placed adjacent to the expected posterior aspect of the MRI enhancement. Postprocedural mammographic films were then obtained and marked for the surgeon. The location of the expected enhancement was discussed in person with the surgeon in the reading room at 10:35 AM. The patient tolerated the procedure well without immediate complications and was transported to the operating room.      IMPRESSION: Successful mammographic-guided needle localization of right 9:00 breast cancer. Pathology results are pending. Submitted specimen radiograph demonstrates intact localization wires and the biopsy clip. This was reported to Dr. Keke Zimmerman in the operating room at the time of acquisition.       [facility] 3d Huy W Mammo Bi Screening Incl Cad     Result Date: 8/13/2019  STUDY:  Bilateral digital screening mammogram with CAD and Tomosynthesis 7/31/2019 INDICATION:  Routine screening mammogram. COMPARISON:  Bilateral mammograms 2/20/2013. Additional prior studies may have been reviewed, as needed, for completion of this report. FINDINGS: Bilateral digital screening mammography, interpreted in conjunction with CAD overread. The breasts are dense which decreases the sensitivity of mammography. The axilla contain benign appearing lymph nodes. No evidence of evolving skin thickening, or nipple retraction is seen. A new small cluster of calcifications is seen in the outer, mid right breast which should be further characterized. An additional new lucent center calcification is seen in the medial right breast although this is felt to have a typically benign appearance and not felt to be worrisome. No evolving dominant mass, spiculated density, or architectural distortion is seen. Bilateral breast tomosynthesis was performed, and is interpreted in conjunction with a computer assisted detection (CAD) system. The prior small cluster of calcifications in the right outer right breast is once again demonstrated and cannot be confirmed to be benign. No associated worrisome asymmetry or architectural changes is suggested. Otherwise, no suspicious masses, calcifications, or architectural distortion are identified otherwise.       IMPRESSION:  1.  New small cluster of calcifications in the outer, mid right breast.  Further evaluation with spot magnification diagnostic mammogram is recommended. We are mailing breast density information to the patient along with the mammogram report. A reminder letter will be scheduled at the appropriate time pending additional views. BI-RADS Assessment Category 0: Incomplete: Needs additional imaging evaluation.  BD4      Bilateral digital screening mammogram with CAD and Tomosynthesis,  8/10/2020     INDICATION:  Screening;  right breast carcinoma status lumpectomy 2019     COMPARISON:  07/31/2019, 02/20/2013, diagnostic mammogram right breast  08/15/2019         FINDINGS: Bilateral digital screening mammography was performed, and is  interpreted in conjunction with a computer assisted detection (CAD) system. The  breast parenchyma is heterogeneously dense, which limits the sensitivity of  mammography. There have been interval lumpectomy changes within the lateral  right breast. No suspicious masses, calcifications, or architectural distortion  are identified. There is no skin thickening or nipple retraction.      Bilateral breast tomosynthesis was performed, and is interpreted in conjunction  with a computer assisted detection (CAD) system. No suspicious masses,  calcifications, or architectural distortion are identified otherwise.       IMPRESSION: Interval post treatment related changes on the right. No  mammographic evidence of malignancy.       Recommend annual mammogram in one year. A reminder letter will be scheduled.       IMPRESSION: Sarmad Ochoa is a 47 y.o. female with Right breast DCIS, pTisNxMx, Stage 0, ER 94%, [place] 83%. She was treated with adjuvant radiation therapy 11/12/2019 - 12/11/2019. Ms Jesus Weathers declined endocrine therapy.      Ms Jesus Weathers is now 14 months out from completion of adjuvant radiation to right breast. She is recovering as anticipated. Mammogram done 8/11/2020 revealed post treatment related changes with no mammographic evidence of malignancy. Repeat due in 8/2021.      PLAN:  1) We discussed side effects related to radiation and ongoing surveillance for her breast cancer s/p radiation therapy to include follow up every 6 months through year 5 then yearly   2) Mammogram with tomosynthesis - repeat 1 year - scheduled for August   3) Declined endocrine therapy - followed by medical oncology prn    4) Monthly SBE  5) Alternate virtual with office visit - RTC 6 months can be VV and then OV in 1 year. All questions were answered to the best of my ability.     Portions of this note were copied from prior encounters and reviewed for accuracy, currency, and represent documentation and tasks completed during this encounter. I verify and attest these portions to be unchanged from prior visits.             Missy Montes, [address] [facility]/Radiation Oncology   [address] [place] [address]  Office : [phone]  Fax : [phone]

## 2021-02-18 NOTE — PROGRESS NOTES
6 Month Follow Up    RT End: 12/11/2019    Previous Treatments:  -8/21/2019 - right breast biopsy  -10/09/2019 - right breast lumpectomy  -Adjuvant radiation to right breast  -declined endocrine therapy    3D Mammo due 8/2021  -ordered not scheduled        [facility], [address]

## 2021-08-16 ENCOUNTER — HOSPITAL ENCOUNTER (OUTPATIENT)
Dept: MAMMOGRAPHY | Age: 51
Discharge: HOME | End: 2021-08-16
Attending: NURSE PRACTITIONER
Payer: COMMERCIAL

## 2021-08-16 DIAGNOSIS — D05.11 DUCTAL CARCINOMA IN SITU (DCIS) OF RIGHT BREAST: ICD-10-CM

## 2021-08-16 PROCEDURE — 77063 BREAST TOMOSYNTHESIS BI: CPT

## 2021-08-19 ENCOUNTER — HOSPITAL ENCOUNTER (OUTPATIENT)
Dept: RADIATION ONCOLOGY | Age: 51
Discharge: HOME | End: 2021-08-19
Payer: COMMERCIAL

## 2021-08-19 ENCOUNTER — APPOINTMENT (RX ONLY)
Dept: URBAN - METROPOLITAN AREA CLINIC 24 | Facility: CLINIC | Age: 51
Setting detail: DERMATOLOGY
End: 2021-08-19

## 2021-08-19 VITALS
TEMPERATURE: 98.1 F | HEART RATE: 60 BPM | WEIGHT: 130.1 LBS | OXYGEN SATURATION: 99 % | SYSTOLIC BLOOD PRESSURE: 110 MMHG | DIASTOLIC BLOOD PRESSURE: 77 MMHG | BODY MASS INDEX: 21.99 KG/M2

## 2021-08-19 DIAGNOSIS — L57.8 OTHER SKIN CHANGES DUE TO CHRONIC EXPOSURE TO NONIONIZING RADIATION: ICD-10-CM

## 2021-08-19 DIAGNOSIS — Z85.828 PERSONAL HISTORY OF OTHER MALIGNANT NEOPLASM OF SKIN: ICD-10-CM

## 2021-08-19 DIAGNOSIS — L82.1 OTHER SEBORRHEIC KERATOSIS: ICD-10-CM

## 2021-08-19 DIAGNOSIS — L81.8 OTHER SPECIFIED DISORDERS OF PIGMENTATION: ICD-10-CM

## 2021-08-19 DIAGNOSIS — Z71.89 OTHER SPECIFIED COUNSELING: ICD-10-CM

## 2021-08-19 DIAGNOSIS — D22 MELANOCYTIC NEVI: ICD-10-CM | Status: STABLE

## 2021-08-19 DIAGNOSIS — D05.11 DUCTAL CARCINOMA IN SITU (DCIS) OF RIGHT BREAST: Primary | ICD-10-CM

## 2021-08-19 DIAGNOSIS — L81.4 OTHER MELANIN HYPERPIGMENTATION: ICD-10-CM

## 2021-08-19 PROBLEM — D22.62 MELANOCYTIC NEVI OF LEFT UPPER LIMB, INCLUDING SHOULDER: Status: ACTIVE | Noted: 2021-08-19

## 2021-08-19 PROBLEM — D22.0 MELANOCYTIC NEVI OF LIP: Status: ACTIVE | Noted: 2021-08-19

## 2021-08-19 PROBLEM — D23.71 OTHER BENIGN NEOPLASM OF SKIN OF RIGHT LOWER LIMB, INCLUDING HIP: Status: ACTIVE | Noted: 2021-08-19

## 2021-08-19 PROBLEM — D22.72 MELANOCYTIC NEVI OF LEFT LOWER LIMB, INCLUDING HIP: Status: ACTIVE | Noted: 2021-08-19

## 2021-08-19 PROCEDURE — ? COUNSELING

## 2021-08-19 PROCEDURE — 99211 OFF/OP EST MAY X REQ PHY/QHP: CPT

## 2021-08-19 PROCEDURE — 99213 OFFICE O/P EST LOW 20 MIN: CPT

## 2021-08-19 ASSESSMENT — LOCATION DETAILED DESCRIPTION DERM
LOCATION DETAILED: LEFT MEDIAL SUPERIOR CHEST
LOCATION DETAILED: LEFT RIB CAGE
LOCATION DETAILED: EPIGASTRIC SKIN
LOCATION DETAILED: LEFT POSTERIOR SHOULDER
LOCATION DETAILED: RIGHT MEDIAL FOREHEAD
LOCATION DETAILED: LEFT PROXIMAL POSTERIOR UPPER ARM
LOCATION DETAILED: LEFT INFERIOR LATERAL FOREHEAD
LOCATION DETAILED: RIGHT LATERAL DORSAL FOOT
LOCATION DETAILED: RIGHT DISTAL DORSAL FOREARM
LOCATION DETAILED: LEFT POPLITEAL SKIN
LOCATION DETAILED: RIGHT RIB CAGE
LOCATION DETAILED: RIGHT PROXIMAL POSTERIOR UPPER ARM
LOCATION DETAILED: RIGHT UPPER CUTANEOUS LIP
LOCATION DETAILED: LEFT SUPERIOR MEDIAL UPPER BACK

## 2021-08-19 ASSESSMENT — LOCATION SIMPLE DESCRIPTION DERM
LOCATION SIMPLE: RIGHT POSTERIOR UPPER ARM
LOCATION SIMPLE: RIGHT FOREARM
LOCATION SIMPLE: LEFT FOREHEAD
LOCATION SIMPLE: LEFT POSTERIOR UPPER ARM
LOCATION SIMPLE: RIGHT LIP
LOCATION SIMPLE: LEFT POPLITEAL SKIN
LOCATION SIMPLE: RIGHT FOREHEAD
LOCATION SIMPLE: ABDOMEN
LOCATION SIMPLE: LEFT UPPER BACK
LOCATION SIMPLE: CHEST
LOCATION SIMPLE: LEFT SHOULDER
LOCATION SIMPLE: RIGHT FOOT

## 2021-08-19 ASSESSMENT — LOCATION ZONE DERM
LOCATION ZONE: ARM
LOCATION ZONE: LIP
LOCATION ZONE: FEET
LOCATION ZONE: LEG
LOCATION ZONE: TRUNK
LOCATION ZONE: FACE

## 2021-08-19 NOTE — PROGRESS NOTES
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 Dose: Right breast: 4256 cGy in 16 fractions, Right breast boost: 1000 cGy in 4 fractions. Treatment dates: 11/12/2019 - 12/11/2019.   4) Declined endocrine therapy     HISTORY OF PRESENT ILLNESS:  Clemencia Espana is a 47 y.o. female initially seen by Dr. Milton Wood at the request of Dr. Loy Nair.  She originally presented for routine mammography where an abnormality with calcifications was noted in the right breas at the right central 9:00 position. Her 3-D mammography was completed 8/15/2019 and subsequent biopsy 8/15/19 confirmed ductal carcinoma in situ, ER 94%, NH 83%. Staging MRI showed a degree of enhancement measuring 4 cm around the initial biopsy site. Surgical options were discussed and ultimately she elected to proceed with breast conserving surgery. She completed right breast lumpectomy 10/9/19. Final pathology revealed no residual ductal carcinoma in situ She was seen back in follow-up 10/16/2019 and was noted to be doing well. She was presented in conference and recommendations were made for referral to medical and radiation oncology which was the rationale for our consultation 10/25/2019. She was accompanied by her  and research was present.    [facility] without complaints week 1-2, other than fatigue that developed and hot flashes that worsened on week 2.  There were no issues with week 3 and 4.       INTERVAL HISTORY:  01/15/20: Ms Gabriella Babb returns 1 month out from completion of radiation therapy. At this time, Ms Gabriella Babb is doing very well. She reports mild itching around the nipple, otherwise no breast complaints. Good appetite. No swallowing issues. Energy continues to improve.  Overall doing well.       08/13/20: Virtual visit: Ms Gabriella Babb is now 6 months out from completing radiation therapy. At this time, Ms Kalie Franklin is doing well. She reports no breast issues. Not consistent with SBE, but \"trying to be better. \" Good appetite. No swallowing complaints. Denies cough or shortness of breath. Remains active playing tennis, although she recently hurt her ankle so currently resting. Feels well. No new complaints. 2/18/21: Here today for follow up. Doing well since last seen. Feels back to normal after radiation therapy. No breast concerns or changes. No pain. No infectious symptoms. 8/19/21: Ms. Kalie Franklin presents 20 months after completion of adjuvant radiation. She endorses occasional soreness in the right breast but denies pain, swelling, and decreased range of motion. Her energy level is at baseline. A repeat mammogram 8/16/21 demonstrated stable appearance of the right breast without evidence of malignancy on either side. A breast MRI was recommended to establish a new post-treatment baseline. OBSTETRIC HISTORY:    Menarche at the age of 14.    G4,P3. Oral Contraceptive Pills:  Yes  Hormone Replacement Therapy:  None     PAST MEDICAL HISTORY:    Past Medical History:   Diagnosis Date    Breast cancer ([facility] [address] 09/2019    Rt breast    Cancer ([facility] [address]     right breast DCIS    Ductal carcinoma in situ of breast 09/2019    Rt breast      PAST SURGICAL HISTORY:   Past Surgical History:   Procedure Laterality Date    HX BREAST BIOPSY Right     DCIS    HX BREAST BIOPSY Right 10/9/2019    RIGHT BREAST NEEDLE LOCALIZED BIOPSY  PREOP 7:30/ LOC 8:30/ SURGERY 10:30 performed by Ping Manley DO at [address] [place] [address] HX COLONOSCOPY      x2    HX HEENT  2009    lasix     HX MASTECTOMY Right 10/9/2019    RIGHT PARTIAL BREAST MASTECTOMY performed by Ping Manley DO at [address] [place] [address] HX ORTHOPAEDIC Right 2007    knee     MEDICATIONS:   No current outpatient medications on file prior to visit.      No current facility-administered medications on file prior to visit.      ALLERGIES:   No Known Allergies     SOCIAL HISTORY:   Social History     Socioeconomic History    Marital status:      Spouse name: Not on file    Number of children: Not on file    Years of education: Not on file    Highest education level: Not on file   Occupational History    Not on file   Tobacco Use    Smoking status: Never Smoker    Smokeless tobacco: Never Used   Substance and Sexual Activity    Alcohol use: Yes     Comment: ocass    Drug use: Never    Sexual activity: Not on file   Other Topics Concern     Service Not Asked    Blood Transfusions Not Asked    Caffeine Concern Not Asked    Occupational Exposure Not Asked    Hobby Hazards Not Asked    Sleep Concern Not Asked    Stress Concern Not Asked    Weight Concern Not Asked    Special Diet Not Asked    Back Care Not Asked    Exercise Not Asked    Bike Helmet Not Asked   [address] Not Asked    Self-Exams Not Asked   Social History Narrative    Not on file     Social Determinants of Health     Financial Resource Strain:     Difficulty of Paying Living Expenses:    Food Insecurity:     Worried About Running Out of Food in the Last Year:     Ran Out of Food in the Last Year:    Transportation Needs:     Lack of Transportation (Medical):      Lack of Transportation (Non-Medical):    Physical Activity:     Days of Exercise per Week:     Minutes of Exercise per Session:    Stress:     Feeling of Stress :    Social Connections:     Frequency of Communication with Friends and Family:     Frequency of Social Gatherings with Friends and Family:     Attends Jew Services:     Active Member of Clubs or Organizations:     Attends Club or Organization Meetings:     Marital Status:    Intimate Partner Violence:     Fear of Current or Ex-Partner:     Emotionally Abused:     Physically Abused:     Sexually Abused:       FAMILY HISTORY:   Family History   Problem Relation Age of Onset    Cancer Maternal Grandfather         colon    Breast Cancer Neg Hx       REVIEW OF SYSTEMS: Please see interval history      PHYSICAL EXAMINATION:   General: well developed/nourished adult Female in no acute distress; appears stated age  [de-identified]: normocephalic, atraumatic; EOMI  Neck: supple with full ROM  Respiratory: normal inspiratory effort, no audible wheezes  Extremities: no cyanosis, clubbing, or edema  Musculoskeletal: mobility intact x4; normal ROM in all joints  Skin: no skin lesions identified  Neuro: AOx3; sensation intact x 4; CNII-XII grossly intact  Psych: appropriate affect, insight, and judgement  GI: abdomen soft, non-distended  Breast: breasts symmetric bilaterally without skin changes or nipple discharge. Right breast lumpectomy incision well healed without tenderness to palpation. No palpable masses in the right or left breast.     PATHOLOGY:    No interval pathology.     LABORATORY:   No results found for this visit on 08/19/21 (from the past 12 hour(s)). RADIOLOGY:    Mammogram 8/16/21 personally reviewed as per HPI. IMPRESSION: Vanessa Glover is a 47 y.o. female with Right breast DCIS, pTisNxMx, Stage 0, ER 94%, TX 83%. She was treated with adjuvant radiation therapy 11/12/2019 - 12/11/2019. Ms Elinor Chandler declined endocrine therapy.      Ms Elinor Chandler is now 20 months out from completion of adjuvant radiation to right breast. She is recovering as anticipated and will undergo repeat bilateral breast MRI now and mammogram 08/2022.     PLAN:  1) We discussed side effects related to radiation and ongoing surveillance for her breast cancer s/p radiation therapy to include follow up every 6 months through year 5 then yearly alternating in person and virtual  2) MRI now, mammogram 08/2022  3) Declined endocrine therapy - followed by medical oncology prn    4) Monthly SBE    All questions were answered to the best of my ability.     Portions of this note were copied from prior encounters and reviewed for accuracy, currency, and represent documentation and tasks completed during this encounter. I verify and attest these portions to be unchanged from prior visits.

## 2021-08-19 NOTE — HPI: FULL BODY SKIN EXAMINATION
What Type Of Note Output Would You Prefer (Optional)?: Standard Output
What Is The Reason For Today's Visit?: Full Body Skin Examination
What Is The Reason For Today's Visit? (Being Monitored For X): concerning skin lesions on an annual basis
How Severe Are Your Spot(S)?: mild
Additional History: Pt gives verbal consent for biopsy.Amisha

## 2021-08-19 NOTE — NURSE NAVIGATOR
Follow up right breast DCIS. Lumpectomy 10-9-19. RT end 12-11-19. Mammogram 8-16-21. F/u medical oncology as needed. Declined AI. Pt denies pain.     Sara Su RN

## 2022-02-17 ENCOUNTER — APPOINTMENT (OUTPATIENT)
Dept: RADIATION ONCOLOGY | Age: 52
End: 2022-02-17

## 2022-03-02 ENCOUNTER — HOSPITAL ENCOUNTER (OUTPATIENT)
Dept: MRI IMAGING | Age: 52
Discharge: HOME | End: 2022-03-02
Attending: RADIOLOGY
Payer: COMMERCIAL

## 2022-03-02 DIAGNOSIS — D05.11 DUCTAL CARCINOMA IN SITU (DCIS) OF RIGHT BREAST: ICD-10-CM

## 2022-03-02 PROCEDURE — 74011000258 HC RX REV CODE- 258: Performed by: RADIOLOGY

## 2022-03-02 PROCEDURE — 74011250636 HC RX REV CODE- 250/636: Performed by: RADIOLOGY

## 2022-03-02 PROCEDURE — 77049 MRI BREAST C-+ W/CAD BI: CPT

## 2022-03-02 PROCEDURE — A9576 INJ PROHANCE MULTIPACK: HCPCS | Performed by: RADIOLOGY

## 2022-03-02 RX ORDER — SODIUM CHLORIDE 0.9 % (FLUSH) 0.9 %
10 SYRINGE (ML) INJECTION
Status: DISCONTINUED | OUTPATIENT
Start: 2022-03-02 | End: 2022-03-03 | Stop reason: HOSPADM

## 2022-03-02 RX ADMIN — GADOTERIDOL 11 ML: 279.3 INJECTION, SOLUTION INTRAVENOUS at 15:03

## 2022-03-02 RX ADMIN — SODIUM CHLORIDE 100 ML: 900 INJECTION, SOLUTION INTRAVENOUS at 14:57

## 2022-03-07 ENCOUNTER — HOSPITAL ENCOUNTER (OUTPATIENT)
Dept: MAMMOGRAPHY | Age: 52
Discharge: HOME | End: 2022-03-07
Attending: RADIOLOGY
Payer: COMMERCIAL

## 2022-03-07 DIAGNOSIS — R92.8 ABNORMAL MRI, BREAST: ICD-10-CM

## 2022-03-07 DIAGNOSIS — Z85.3 HX OF BREAST CANCER: ICD-10-CM

## 2022-03-07 PROCEDURE — 76642 ULTRASOUND BREAST LIMITED: CPT

## 2022-08-25 ENCOUNTER — APPOINTMENT (RX ONLY)
Dept: URBAN - METROPOLITAN AREA CLINIC 24 | Facility: CLINIC | Age: 52
Setting detail: DERMATOLOGY
End: 2022-08-25

## 2022-08-25 DIAGNOSIS — Z85.828 PERSONAL HISTORY OF OTHER MALIGNANT NEOPLASM OF SKIN: ICD-10-CM

## 2022-08-25 DIAGNOSIS — L81.8 OTHER SPECIFIED DISORDERS OF PIGMENTATION: ICD-10-CM

## 2022-08-25 DIAGNOSIS — L57.8 OTHER SKIN CHANGES DUE TO CHRONIC EXPOSURE TO NONIONIZING RADIATION: ICD-10-CM

## 2022-08-25 DIAGNOSIS — D22 MELANOCYTIC NEVI: ICD-10-CM

## 2022-08-25 DIAGNOSIS — L82.1 OTHER SEBORRHEIC KERATOSIS: ICD-10-CM

## 2022-08-25 DIAGNOSIS — L81.4 OTHER MELANIN HYPERPIGMENTATION: ICD-10-CM

## 2022-08-25 PROBLEM — D23.71 OTHER BENIGN NEOPLASM OF SKIN OF RIGHT LOWER LIMB, INCLUDING HIP: Status: ACTIVE | Noted: 2022-08-25

## 2022-08-25 PROBLEM — D22.0 MELANOCYTIC NEVI OF LIP: Status: ACTIVE | Noted: 2022-08-25

## 2022-08-25 PROBLEM — D22.62 MELANOCYTIC NEVI OF LEFT UPPER LIMB, INCLUDING SHOULDER: Status: ACTIVE | Noted: 2022-08-25

## 2022-08-25 PROBLEM — D22.72 MELANOCYTIC NEVI OF LEFT LOWER LIMB, INCLUDING HIP: Status: ACTIVE | Noted: 2022-08-25

## 2022-08-25 PROCEDURE — ? OTHER

## 2022-08-25 PROCEDURE — ? COUNSELING

## 2022-08-25 PROCEDURE — 99213 OFFICE O/P EST LOW 20 MIN: CPT

## 2022-08-25 ASSESSMENT — LOCATION DETAILED DESCRIPTION DERM
LOCATION DETAILED: LEFT RIB CAGE
LOCATION DETAILED: LEFT INFERIOR LATERAL FOREHEAD
LOCATION DETAILED: RIGHT LATERAL DORSAL FOOT
LOCATION DETAILED: LEFT POPLITEAL SKIN
LOCATION DETAILED: LEFT MEDIAL SUPERIOR CHEST
LOCATION DETAILED: RIGHT PROXIMAL POSTERIOR UPPER ARM
LOCATION DETAILED: RIGHT UPPER CUTANEOUS LIP
LOCATION DETAILED: LEFT PROXIMAL POSTERIOR UPPER ARM
LOCATION DETAILED: RIGHT RIB CAGE
LOCATION DETAILED: LEFT POSTERIOR SHOULDER
LOCATION DETAILED: EPIGASTRIC SKIN
LOCATION DETAILED: RIGHT MEDIAL FOREHEAD
LOCATION DETAILED: LEFT SUPERIOR MEDIAL UPPER BACK
LOCATION DETAILED: RIGHT DISTAL DORSAL FOREARM
LOCATION DETAILED: LEFT KNEE

## 2022-08-25 ASSESSMENT — LOCATION SIMPLE DESCRIPTION DERM
LOCATION SIMPLE: RIGHT LIP
LOCATION SIMPLE: LEFT POSTERIOR UPPER ARM
LOCATION SIMPLE: RIGHT POSTERIOR UPPER ARM
LOCATION SIMPLE: LEFT POPLITEAL SKIN
LOCATION SIMPLE: ABDOMEN
LOCATION SIMPLE: LEFT KNEE
LOCATION SIMPLE: LEFT SHOULDER
LOCATION SIMPLE: LEFT FOREHEAD
LOCATION SIMPLE: CHEST
LOCATION SIMPLE: RIGHT FOOT
LOCATION SIMPLE: LEFT UPPER BACK
LOCATION SIMPLE: RIGHT FOREHEAD
LOCATION SIMPLE: RIGHT FOREARM

## 2022-08-25 ASSESSMENT — LOCATION ZONE DERM
LOCATION ZONE: ARM
LOCATION ZONE: TRUNK
LOCATION ZONE: FACE
LOCATION ZONE: LIP
LOCATION ZONE: FEET
LOCATION ZONE: LEG

## 2022-08-25 NOTE — PROCEDURE: OTHER
Detail Level: Zone
Note Text (......Xxx Chief Complaint.): This diagnosis correlates with the
Other (Free Text): Pt fell on asphalt
Render Risk Assessment In Note?: no

## 2022-09-08 ENCOUNTER — APPOINTMENT (OUTPATIENT)
Dept: RADIATION ONCOLOGY | Age: 52
End: 2022-09-08

## 2022-09-09 ENCOUNTER — HOSPITAL ENCOUNTER (OUTPATIENT)
Dept: MRI IMAGING | Age: 52
Discharge: HOME | End: 2022-09-12
Payer: COMMERCIAL

## 2022-09-09 DIAGNOSIS — D05.11 DUCTAL CARCINOMA IN SITU OF RIGHT BREAST: ICD-10-CM

## 2022-09-09 DIAGNOSIS — N63.0 BREAST NODULE: ICD-10-CM

## 2022-09-09 PROCEDURE — C8908 MRI W/O FOL W/CONT, BREAST,: HCPCS

## 2022-09-09 PROCEDURE — A9579 GAD-BASE MR CONTRAST NOS,1ML: HCPCS | Performed by: NURSE PRACTITIONER

## 2022-09-09 PROCEDURE — 6360000004 HC RX CONTRAST MEDICATION: Performed by: NURSE PRACTITIONER

## 2022-09-09 RX ADMIN — GADOTERIDOL 11 ML: 279.3 INJECTION, SOLUTION INTRAVENOUS at 15:24

## 2022-09-13 ENCOUNTER — HOSPITAL ENCOUNTER (OUTPATIENT)
Dept: RADIATION ONCOLOGY | Age: 52
Setting detail: RECURRING SERIES
Discharge: HOME | End: 2022-09-16
Payer: COMMERCIAL

## 2022-09-13 VITALS
HEART RATE: 67 BPM | SYSTOLIC BLOOD PRESSURE: 99 MMHG | DIASTOLIC BLOOD PRESSURE: 71 MMHG | WEIGHT: 130.7 LBS | OXYGEN SATURATION: 100 % | TEMPERATURE: 98.1 F

## 2022-09-13 DIAGNOSIS — Z12.31 SCREENING MAMMOGRAM FOR HIGH-RISK PATIENT: ICD-10-CM

## 2022-09-13 DIAGNOSIS — Z86.000 HISTORY OF DUCTAL CARCINOMA IN SITU (DCIS) OF BREAST: ICD-10-CM

## 2022-09-13 DIAGNOSIS — D05.11 DUCTAL CARCINOMA IN SITU (DCIS) OF RIGHT BREAST: Primary | ICD-10-CM

## 2022-09-13 PROCEDURE — 99211 OFF/OP EST MAY X REQ PHY/QHP: CPT

## 2022-09-13 NOTE — PROGRESS NOTES
Follow up right breast DCIS. RT end 12-11-19. History of  lumpectomy 10-9-19. Pt denies pain. Bilateral breast MRI 9-9-22. Bilateral screening mammogram ordered for now and scheduled 9-22-22. Pt to f/u in Radiation Oncology in one year after next mammogram.    Adeel Rice.  Yanique Alvarez

## 2022-09-13 NOTE — PROGRESS NOTES
RADIATION ONCOLOGY FOLLOW UP VISIT  09/13/22    Chief Complaint: Routine follow up for surveillance     History of Present Illness:  Diagnosis  45 y.o. female with history of right breast DCIS, pTisNxMx, Stage 0, ER/AR+  Treatment  Right lumpectomy 10/9/2019  CHANGES CONSISTENT WITH PRIOR BIOPSY SITE; FOCAL ATYPICAL DUCTAL HYPERPLASIA, MARGINS UNINVOLVED; NO RESIDUAL DUCTAL CARCINOMA IN SITU IDENTIFIED. Adjuvant RT to right breast, 42.56 Gy/16 fx + boost 10 Gy/4 fx, 11/12/2019-12/11/2019 with Dr. Ene Stinson endocrine therapy  Current symptoms/ROS:   She is doing well and has no complaints today. Breast lumps/masses: no  Skin: no major changes   Restricted ROM: no  Lymphedema: no  Cough/dyspnea: no  Headaches/neuro sx: no  New imaging:  Bilateral breast MRI 9/9/2022: Stable posttreatment changes outer posterior right breast.  Stable benign-appearing 1 cm round mass left breast.  BI-RADS 2 benign. Most recent mammogram 8/16/2021 benign. Breast MRI recommended to follow-up preop MRI findings. Smoker: no  Meds: Reviewed. Pertinent - none. Physical Examination:  Vitals:    09/13/22 0830   BP: 99/71   Pulse: 67   Temp: 98.1 °F (36.7 °C)   TempSrc: Oral   SpO2: 100%   Weight: 130 lb 11.2 oz (59.3 kg)     Pain 0/10  ECOG 0 - Fully active; no performance restrictions. Well nourished, in NAD. Alert and conversant. Lungs CTAB. RRR. Right breast: Lumpectomy scar outer right breast, well healed. Palpable adjacent scar tissue. No concerning palpable masses, skin abnormalities, or nipple discharge. Left breast: No palpable masses, skin abnormalities, or nipple discharge. No persistent RT related skin changes/fibrosis. No supraclavicular or axillary lymphadenopathy. ROM full in RUE. No lymphedema. Pt declined chaperone. Assessment:  History of right breast DCIS     Now nearly 3 years out from completion of RT.   Persistent toxicity: none  Disease status: No evidence of disease Plan:  -B/l MMG now, if normal, repeat in 1 year  -Follow up here in 1 year or sooner if needed   -The patient has a documented plan of care to address pain. Pain is not present. Jane Fuller MD  09/13/22    I spent a total of 22 minutes today performing the following care related activities for [address]:   Face to face exam/evaluation, /Educate Patient/Caregivers, Pre-Charting/Documenting after the visit, and Interpreting/Ordering Meds, Tests, Procedures

## 2022-09-22 ENCOUNTER — HOSPITAL ENCOUNTER (OUTPATIENT)
Dept: MAMMOGRAPHY | Age: 52
Discharge: HOME | End: 2022-09-25
Payer: COMMERCIAL

## 2022-09-22 DIAGNOSIS — D05.11 DUCTAL CARCINOMA IN SITU (DCIS) OF RIGHT BREAST: ICD-10-CM

## 2022-09-22 DIAGNOSIS — Z12.31 SCREENING MAMMOGRAM FOR HIGH-RISK PATIENT: ICD-10-CM

## 2022-09-22 PROCEDURE — 77063 BREAST TOMOSYNTHESIS BI: CPT

## 2023-09-13 ENCOUNTER — APPOINTMENT (OUTPATIENT)
Dept: RADIATION ONCOLOGY | Age: 53
End: 2023-09-13
Payer: COMMERCIAL

## 2023-09-25 ENCOUNTER — HOSPITAL ENCOUNTER (OUTPATIENT)
Dept: MAMMOGRAPHY | Age: 53
Discharge: HOME | End: 2023-09-28
Attending: RADIOLOGY
Payer: COMMERCIAL

## 2023-09-25 VITALS — WEIGHT: 130 LBS | HEIGHT: 65 IN | BODY MASS INDEX: 21.66 KG/M2

## 2023-09-25 DIAGNOSIS — D05.11 DUCTAL CARCINOMA IN SITU (DCIS) OF RIGHT BREAST: ICD-10-CM

## 2023-09-25 DIAGNOSIS — Z12.31 SCREENING MAMMOGRAM FOR HIGH-RISK PATIENT: ICD-10-CM

## 2023-09-25 PROCEDURE — 77063 BREAST TOMOSYNTHESIS BI: CPT

## 2023-09-27 ENCOUNTER — HOSPITAL ENCOUNTER (OUTPATIENT)
Dept: RADIATION ONCOLOGY | Age: 53
Setting detail: RECURRING SERIES
Discharge: HOME | End: 2023-09-30

## 2023-09-27 VITALS
WEIGHT: 131.9 LBS | RESPIRATION RATE: 18 BRPM | BODY MASS INDEX: 21.95 KG/M2 | SYSTOLIC BLOOD PRESSURE: 125 MMHG | HEART RATE: 68 BPM | TEMPERATURE: 97.7 F | DIASTOLIC BLOOD PRESSURE: 67 MMHG | OXYGEN SATURATION: 99 %

## 2023-09-27 DIAGNOSIS — Z86.000 HISTORY OF DUCTAL CARCINOMA IN SITU (DCIS) OF BREAST: Primary | ICD-10-CM

## 2023-09-27 ASSESSMENT — PATIENT HEALTH QUESTIONNAIRE - PHQ9
SUM OF ALL RESPONSES TO PHQ QUESTIONS 1-9: 0
SUM OF ALL RESPONSES TO PHQ QUESTIONS 1-9: 0
2. FEELING DOWN, DEPRESSED OR HOPELESS: 0
SUM OF ALL RESPONSES TO PHQ9 QUESTIONS 1 & 2: 0
SUM OF ALL RESPONSES TO PHQ QUESTIONS 1-9: 0
SUM OF ALL RESPONSES TO PHQ QUESTIONS 1-9: 0
1. LITTLE INTEREST OR PLEASURE IN DOING THINGS: 0

## 2023-09-27 NOTE — PROGRESS NOTES
1 Year Follow Up (Right Breast DCIS): -Mammogram Results    Patient presents complaining of:  -none      Elizabeth Lynn MA

## 2023-09-28 ENCOUNTER — APPOINTMENT (OUTPATIENT)
Dept: RADIATION ONCOLOGY | Age: 53
End: 2023-09-28
Payer: COMMERCIAL

## 2023-10-04 ENCOUNTER — APPOINTMENT (RX ONLY)
Dept: URBAN - METROPOLITAN AREA CLINIC 24 | Facility: CLINIC | Age: 53
Setting detail: DERMATOLOGY
End: 2023-10-04

## 2023-10-04 DIAGNOSIS — D22 MELANOCYTIC NEVI: ICD-10-CM

## 2023-10-04 DIAGNOSIS — D18.0 HEMANGIOMA: ICD-10-CM

## 2023-10-04 DIAGNOSIS — L82.1 OTHER SEBORRHEIC KERATOSIS: ICD-10-CM

## 2023-10-04 DIAGNOSIS — L72.0 EPIDERMAL CYST: ICD-10-CM

## 2023-10-04 DIAGNOSIS — Z85.828 PERSONAL HISTORY OF OTHER MALIGNANT NEOPLASM OF SKIN: ICD-10-CM

## 2023-10-04 DIAGNOSIS — L81.4 OTHER MELANIN HYPERPIGMENTATION: ICD-10-CM

## 2023-10-04 DIAGNOSIS — Z71.89 OTHER SPECIFIED COUNSELING: ICD-10-CM

## 2023-10-04 PROBLEM — D23.71 OTHER BENIGN NEOPLASM OF SKIN OF RIGHT LOWER LIMB, INCLUDING HIP: Status: ACTIVE | Noted: 2023-10-04

## 2023-10-04 PROBLEM — D18.01 HEMANGIOMA OF SKIN AND SUBCUTANEOUS TISSUE: Status: ACTIVE | Noted: 2023-10-04

## 2023-10-04 PROBLEM — D22.5 MELANOCYTIC NEVI OF TRUNK: Status: ACTIVE | Noted: 2023-10-04

## 2023-10-04 PROCEDURE — ? COUNSELING

## 2023-10-04 PROCEDURE — ? OTHER

## 2023-10-04 PROCEDURE — 99213 OFFICE O/P EST LOW 20 MIN: CPT

## 2023-10-04 ASSESSMENT — LOCATION ZONE DERM
LOCATION ZONE: TRUNK
LOCATION ZONE: FINGER
LOCATION ZONE: NOSE
LOCATION ZONE: FEET
LOCATION ZONE: HAND

## 2023-10-04 ASSESSMENT — LOCATION SIMPLE DESCRIPTION DERM
LOCATION SIMPLE: NOSE
LOCATION SIMPLE: RIGHT HAND
LOCATION SIMPLE: RIGHT MIDDLE FINGER
LOCATION SIMPLE: UPPER BACK
LOCATION SIMPLE: RIGHT FOOT

## 2023-10-04 ASSESSMENT — LOCATION DETAILED DESCRIPTION DERM
LOCATION DETAILED: RIGHT LATERAL DORSAL FOOT
LOCATION DETAILED: INFERIOR THORACIC SPINE
LOCATION DETAILED: RIGHT RADIAL DORSAL HAND
LOCATION DETAILED: SUPERIOR THORACIC SPINE
LOCATION DETAILED: NASAL ROOT
LOCATION DETAILED: RIGHT PROXIMAL DORSAL MIDDLE FINGER

## 2023-10-04 NOTE — PROCEDURE: OTHER
Note Text (......Xxx Chief Complaint.): This diagnosis correlates with the
Render Risk Assessment In Note?: no
Other (Free Text): One lesion on right dorsal hand was extracted using 11 blade and cta to confirm diagnosis
Detail Level: Zone

## 2023-10-09 ENCOUNTER — HOSPITAL ENCOUNTER (OUTPATIENT)
Dept: MAMMOGRAPHY | Age: 53
Discharge: HOME | End: 2023-10-12
Attending: RADIOLOGY
Payer: COMMERCIAL

## 2023-10-09 DIAGNOSIS — R92.8 ABNORMAL SCREENING MAMMOGRAM: ICD-10-CM

## 2023-10-09 PROCEDURE — 76642 ULTRASOUND BREAST LIMITED: CPT

## 2023-10-09 PROCEDURE — G0279 TOMOSYNTHESIS, MAMMO: HCPCS

## 2024-03-27 ENCOUNTER — HOSPITAL ENCOUNTER (OUTPATIENT)
Dept: MRI IMAGING | Age: 54
Discharge: HOME | End: 2024-03-30
Attending: STUDENT IN AN ORGANIZED HEALTH CARE EDUCATION/TRAINING PROGRAM
Payer: COMMERCIAL

## 2024-03-27 DIAGNOSIS — Z86.000 HISTORY OF DUCTAL CARCINOMA IN SITU (DCIS) OF BREAST: ICD-10-CM

## 2024-03-27 PROCEDURE — A9579 GAD-BASE MR CONTRAST NOS,1ML: HCPCS | Performed by: STUDENT IN AN ORGANIZED HEALTH CARE EDUCATION/TRAINING PROGRAM

## 2024-03-27 PROCEDURE — C8908 MRI W/O FOL W/CONT, BREAST,: HCPCS

## 2024-03-27 PROCEDURE — 2580000003 HC RX 258: Performed by: STUDENT IN AN ORGANIZED HEALTH CARE EDUCATION/TRAINING PROGRAM

## 2024-03-27 PROCEDURE — 6360000004 HC RX CONTRAST MEDICATION: Performed by: STUDENT IN AN ORGANIZED HEALTH CARE EDUCATION/TRAINING PROGRAM

## 2024-03-27 RX ORDER — SODIUM CHLORIDE 0.9 % (FLUSH) 0.9 %
10 SYRINGE (ML) INJECTION AS NEEDED
Status: DISCONTINUED | OUTPATIENT
Start: 2024-03-27 | End: 2024-03-31 | Stop reason: HOSPADM

## 2024-03-27 RX ADMIN — SODIUM CHLORIDE, PRESERVATIVE FREE 10 ML: 5 INJECTION INTRAVENOUS at 12:10

## 2024-03-27 RX ADMIN — GADOTERIDOL 12 ML: 279.3 INJECTION, SOLUTION INTRAVENOUS at 12:10

## 2024-03-28 DIAGNOSIS — R92.8 ABNORMAL FINDINGS ON DIAGNOSTIC IMAGING OF BREAST: Primary | ICD-10-CM

## 2024-04-16 ENCOUNTER — HOSPITAL ENCOUNTER (OUTPATIENT)
Dept: MAMMOGRAPHY | Age: 54
Discharge: HOME | End: 2024-04-19
Attending: STUDENT IN AN ORGANIZED HEALTH CARE EDUCATION/TRAINING PROGRAM
Payer: COMMERCIAL

## 2024-04-16 DIAGNOSIS — D05.11 DUCTAL CARCINOMA IN SITU (DCIS) OF RIGHT BREAST: Primary | ICD-10-CM

## 2024-04-16 DIAGNOSIS — R92.8 ABNORMAL FINDINGS ON DIAGNOSTIC IMAGING OF BREAST: ICD-10-CM

## 2024-04-16 PROCEDURE — 76642 ULTRASOUND BREAST LIMITED: CPT

## 2024-04-24 ENCOUNTER — HOSPITAL ENCOUNTER (OUTPATIENT)
Dept: MAMMOGRAPHY | Age: 54
Discharge: HOME | End: 2024-04-27
Payer: COMMERCIAL

## 2024-04-24 DIAGNOSIS — R92.8 ABNORMAL FINDING ON BREAST IMAGING: ICD-10-CM

## 2024-04-24 DIAGNOSIS — D05.11 DUCTAL CARCINOMA IN SITU (DCIS) OF RIGHT BREAST: ICD-10-CM

## 2024-04-24 DIAGNOSIS — D05.11 DUCTAL CARCINOMA IN SITU (DCIS) OF RIGHT BREAST: Primary | ICD-10-CM

## 2024-04-24 PROCEDURE — 76642 ULTRASOUND BREAST LIMITED: CPT

## 2024-09-24 ENCOUNTER — HOSPITAL ENCOUNTER (OUTPATIENT)
Dept: MRI IMAGING | Age: 54
Discharge: HOME | End: 2024-09-27
Attending: STUDENT IN AN ORGANIZED HEALTH CARE EDUCATION/TRAINING PROGRAM
Payer: COMMERCIAL

## 2024-09-24 DIAGNOSIS — D05.11 DUCTAL CARCINOMA IN SITU (DCIS) OF RIGHT BREAST: ICD-10-CM

## 2024-09-24 DIAGNOSIS — R92.8 ABNORMAL FINDING ON BREAST IMAGING: ICD-10-CM

## 2024-09-24 PROCEDURE — A9579 GAD-BASE MR CONTRAST NOS,1ML: HCPCS | Performed by: STUDENT IN AN ORGANIZED HEALTH CARE EDUCATION/TRAINING PROGRAM

## 2024-09-24 PROCEDURE — 6360000004 HC RX CONTRAST MEDICATION: Performed by: STUDENT IN AN ORGANIZED HEALTH CARE EDUCATION/TRAINING PROGRAM

## 2024-09-24 PROCEDURE — C8908 MRI W/O FOL W/CONT, BREAST,: HCPCS

## 2024-09-24 RX ADMIN — GADOTERIDOL 12 ML: 279.3 INJECTION, SOLUTION INTRAVENOUS at 13:44

## 2024-10-01 ENCOUNTER — APPOINTMENT (OUTPATIENT)
Dept: RADIATION ONCOLOGY | Age: 54
End: 2024-10-01
Payer: COMMERCIAL

## 2024-10-16 ENCOUNTER — APPOINTMENT (RX ONLY)
Dept: URBAN - METROPOLITAN AREA CLINIC 24 | Facility: CLINIC | Age: 54
Setting detail: DERMATOLOGY
End: 2024-10-16

## 2024-10-16 ENCOUNTER — APPOINTMENT (OUTPATIENT)
Dept: RADIATION ONCOLOGY | Age: 54
End: 2024-10-16
Payer: COMMERCIAL

## 2024-10-16 DIAGNOSIS — Z85.828 PERSONAL HISTORY OF OTHER MALIGNANT NEOPLASM OF SKIN: ICD-10-CM

## 2024-10-16 DIAGNOSIS — Z71.89 OTHER SPECIFIED COUNSELING: ICD-10-CM

## 2024-10-16 DIAGNOSIS — L81.4 OTHER MELANIN HYPERPIGMENTATION: ICD-10-CM

## 2024-10-16 DIAGNOSIS — D22 MELANOCYTIC NEVI: ICD-10-CM

## 2024-10-16 DIAGNOSIS — L82.1 OTHER SEBORRHEIC KERATOSIS: ICD-10-CM

## 2024-10-16 DIAGNOSIS — D18.0 HEMANGIOMA: ICD-10-CM

## 2024-10-16 PROBLEM — D18.01 HEMANGIOMA OF SKIN AND SUBCUTANEOUS TISSUE: Status: ACTIVE | Noted: 2024-10-16

## 2024-10-16 PROBLEM — D23.71 OTHER BENIGN NEOPLASM OF SKIN OF RIGHT LOWER LIMB, INCLUDING HIP: Status: ACTIVE | Noted: 2024-10-16

## 2024-10-16 PROBLEM — D22.5 MELANOCYTIC NEVI OF TRUNK: Status: ACTIVE | Noted: 2024-10-16

## 2024-10-16 PROCEDURE — ? COUNSELING

## 2024-10-16 PROCEDURE — 99213 OFFICE O/P EST LOW 20 MIN: CPT

## 2024-10-16 ASSESSMENT — LOCATION DETAILED DESCRIPTION DERM
LOCATION DETAILED: EPIGASTRIC SKIN
LOCATION DETAILED: INFERIOR THORACIC SPINE
LOCATION DETAILED: RIGHT LATERAL DORSAL FOOT
LOCATION DETAILED: SUPERIOR THORACIC SPINE

## 2024-10-16 ASSESSMENT — LOCATION SIMPLE DESCRIPTION DERM
LOCATION SIMPLE: UPPER BACK
LOCATION SIMPLE: ABDOMEN
LOCATION SIMPLE: RIGHT FOOT

## 2024-10-16 ASSESSMENT — LOCATION ZONE DERM
LOCATION ZONE: TRUNK
LOCATION ZONE: FEET

## 2024-10-22 ENCOUNTER — APPOINTMENT (OUTPATIENT)
Dept: RADIATION ONCOLOGY | Age: 54
End: 2024-10-22
Payer: COMMERCIAL

## 2024-11-15 ENCOUNTER — HOSPITAL ENCOUNTER (OUTPATIENT)
Dept: MAMMOGRAPHY | Age: 54
Discharge: HOME | End: 2024-11-18
Payer: COMMERCIAL

## 2024-11-15 VITALS — WEIGHT: 130 LBS | HEIGHT: 65 IN | BODY MASS INDEX: 21.66 KG/M2

## 2024-11-15 DIAGNOSIS — D05.11 DUCTAL CARCINOMA IN SITU (DCIS) OF RIGHT BREAST: ICD-10-CM

## 2024-11-15 PROCEDURE — 76642 ULTRASOUND BREAST LIMITED: CPT

## 2024-11-15 PROCEDURE — G0279 TOMOSYNTHESIS, MAMMO: HCPCS

## 2024-11-19 ENCOUNTER — HOSPITAL ENCOUNTER (OUTPATIENT)
Dept: RADIATION ONCOLOGY | Age: 54
Setting detail: RECURRING SERIES
Discharge: HOME | End: 2024-11-22
Payer: COMMERCIAL

## 2024-11-19 VITALS
SYSTOLIC BLOOD PRESSURE: 115 MMHG | OXYGEN SATURATION: 100 % | RESPIRATION RATE: 18 BRPM | WEIGHT: 137.7 LBS | TEMPERATURE: 97.8 F | BODY MASS INDEX: 22.91 KG/M2 | HEART RATE: 66 BPM | DIASTOLIC BLOOD PRESSURE: 76 MMHG

## 2024-11-19 DIAGNOSIS — Z12.31 ENCOUNTER FOR SCREENING MAMMOGRAM FOR HIGH-RISK PATIENT: ICD-10-CM

## 2024-11-19 DIAGNOSIS — Z86.000 HISTORY OF DUCTAL CARCINOMA IN SITU (DCIS) OF BREAST: Primary | ICD-10-CM

## 2024-11-19 PROCEDURE — 99211 OFF/OP EST MAY X REQ PHY/QHP: CPT

## 2024-11-19 NOTE — PROGRESS NOTES
Patient here for 12 month Follow up Post DCIS  Mammogram complete    Patient is accompanied by their wife  Patient Vitals as charted  Patient reports a pain level of  0/10  Patient reports no issues  Orders placed for Screening Mammogram 12 months  Follow up with MD imaging

## 2024-11-19 NOTE — PROGRESS NOTES
Patient: Arpita Sarmiento MRN: [id number]  SSN: [id number]    YOB: 1970  Age: 53 y.o.  Sex: female      Other Providers:  Dr. Goncalves    DIAGNOSIS: Routine follow up for surveillance of R breast DCIS sp breast conserving therapy    PREVIOUS TREATMENT:  Treatment  - Right lumpectomy 10/9/2019   Adjuvant RT to right breast, 42.56 Gy/16 fx + boost 10 Gy/4 fx, 11/12/2019-12/11/2019 with Dr. Sharp   Declined adjuvant endocrine therapy    HISTORY OF PRESENT ILLNESS:    INTERVAL HISTORY:  Arpita Sarmiento is a 53 y.o. female w/ hx of right breast DCIS.  She underwent bilateral digital tomography screening on 9/26/2023 which returned as BI-RADS 0 with evolving right breast asymmetry and need to further assessment with spot compression and ultrasound.  She denies any changes to palpation of the breast that she is noted on self-exam, nipple discharge or bleeding, axillary swelling, or any other significant changes.  She denies any fevers, chills, bone pain, headaches, or intentional weight loss. She had recent mammogram that was consistent with BI-RADS 2. Spot US compression of the L breast was negative as well.     MEDICATIONS:   No current outpatient medications on file.    ALLERGIES:   No Known Allergies    PHYSICAL EXAMINATION:   ECOG Performance status 0  VITAL SIGNS:   There were no vitals filed for this visit.       General: well developed/nourished adult Female in no acute distress; appears stated age  HEENT: normocephalic, atraumatic; EOMI  Extremities: no cyanosis, clubbing, or edema  Musculoskeletal: mobility intact x4; normal ROM in all joints  Neuro: AOx3; sensation intact x 4; CNII-XII grossly intact  Psych: appropriate affect, insight, and judgement  Breast: Declined today    LABORATORY: No results found for: \"NA\", \"K\", \"CL\", \"CO2\", \"GLU\", \"BUN\", \"GFRAA\", \"MG\", \"PHOS\", \"TP\", \"GLOB\", \"ALT\"  No results found for: \"WBC\", \"HGB\", \"HCT\", \"PLT\"    RADIOLOGY:  Mammogram (11/15/24)    Stable bilateral

## 2024-11-21 ENCOUNTER — APPOINTMENT (OUTPATIENT)
Dept: RADIATION ONCOLOGY | Age: 54
End: 2024-11-21
Payer: COMMERCIAL

## (undated) DEVICE — MASTISOL ADHESIVE LIQ 2/3ML

## (undated) DEVICE — CONTAINER SPEC HISTOLOGY 900ML POLYPR

## (undated) DEVICE — SUT SLK 2-0SH 30IN BLK --

## (undated) DEVICE — NEEDLE HYPO 21GA L1.5IN INTRAMUSCULAR S STL LATCH BVL UP

## (undated) DEVICE — DRAPE,TOP,102X53,STERILE: Brand: MEDLINE

## (undated) DEVICE — SUTURE MCRYL SZ 3-0 L27IN ABSRB UD L24MM PS-1 3/8 CIR PRIM Y936H

## (undated) DEVICE — SUTURE VCRL SZ 3-0 L27IN ABSRB UD L26MM SH 1/2 CIR J416H

## (undated) DEVICE — GOWN,REINFORCED,POLY,AURORA,XXLARGE,STR: Brand: MEDLINE

## (undated) DEVICE — SURGICAL PROCEDURE PACK BASIC ST FRANCIS

## (undated) DEVICE — APPLIER LIG CLP M L11IN TI STR RNG HNDL FOR 20 CLP DISP

## (undated) DEVICE — CONTAINER COLL/TRNSPRT BX BRST -- E-Z-EM CAT 8390

## (undated) DEVICE — SOLUTION IV 1000ML 0.9% SOD CHL

## (undated) DEVICE — SUT ETHLN 3-0 18IN PS1 BLK --

## (undated) DEVICE — GARMENT,MEDLINE,DVT,INT,CALF,MED, GEN2: Brand: MEDLINE

## (undated) DEVICE — 48" PROBE COVER W/GEL, ULTRASOUND, STERILE: Brand: SITE-RITE

## (undated) DEVICE — SHEET, DRAPE, SPLIT, STERILE: Brand: MEDLINE

## (undated) DEVICE — REM POLYHESIVE ADULT PATIENT RETURN ELECTRODE: Brand: VALLEYLAB

## (undated) DEVICE — (D)PREP SKN CHLRAPRP APPL 26ML -- CONVERT TO ITEM 371833